# Patient Record
Sex: FEMALE | Race: OTHER | Employment: STUDENT | URBAN - METROPOLITAN AREA
[De-identification: names, ages, dates, MRNs, and addresses within clinical notes are randomized per-mention and may not be internally consistent; named-entity substitution may affect disease eponyms.]

---
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
## 2022-01-10 ENCOUNTER — IMMUNIZATION (OUTPATIENT)
Dept: LAB | Facility: HOSPITAL | Age: 22
End: 2022-01-10
Attending: EMERGENCY MEDICINE
Payer: COMMERCIAL

## 2022-01-10 DIAGNOSIS — Z23 NEED FOR VACCINATION: Primary | ICD-10-CM

## 2022-01-10 PROCEDURE — 0004A SARSCOV2 VAC 30MCG/0.3ML IM: CPT

## 2022-01-10 PROCEDURE — 0054A SARSCOV2 VAC 30MCG/0.3ML IM: CPT

## 2022-02-03 ENCOUNTER — TELEPHONE (OUTPATIENT)
Dept: INTERNAL MEDICINE CLINIC | Facility: CLINIC | Age: 22
End: 2022-02-03

## 2022-07-05 ENCOUNTER — OFFICE VISIT (OUTPATIENT)
Dept: INTERNAL MEDICINE CLINIC | Facility: CLINIC | Age: 22
End: 2022-07-05
Payer: COMMERCIAL

## 2022-07-05 VITALS
HEART RATE: 78 BPM | HEIGHT: 61 IN | SYSTOLIC BLOOD PRESSURE: 112 MMHG | BODY MASS INDEX: 33.83 KG/M2 | OXYGEN SATURATION: 100 % | DIASTOLIC BLOOD PRESSURE: 70 MMHG | WEIGHT: 179.19 LBS

## 2022-07-05 DIAGNOSIS — E55.9 VITAMIN D DEFICIENCY: ICD-10-CM

## 2022-07-05 DIAGNOSIS — Z00.00 ANNUAL PHYSICAL EXAM: Primary | ICD-10-CM

## 2022-07-05 DIAGNOSIS — E66.9 OBESITY, UNSPECIFIED CLASSIFICATION, UNSPECIFIED OBESITY TYPE, UNSPECIFIED WHETHER SERIOUS COMORBIDITY PRESENT: ICD-10-CM

## 2022-07-05 DIAGNOSIS — F41.9 ANXIETY: ICD-10-CM

## 2022-07-05 DIAGNOSIS — R74.8 ELEVATED LIVER ENZYMES: Primary | ICD-10-CM

## 2022-07-05 DIAGNOSIS — E04.9 ENLARGED THYROID: ICD-10-CM

## 2022-07-05 LAB
ALBUMIN SERPL-MCNC: 3.9 G/DL (ref 3.4–5)
ALBUMIN/GLOB SERPL: 1 {RATIO} (ref 1–2)
ALP LIVER SERPL-CCNC: 51 U/L
ALT SERPL-CCNC: 78 U/L
ANION GAP SERPL CALC-SCNC: 10 MMOL/L (ref 0–18)
AST SERPL-CCNC: 53 U/L (ref 15–37)
BASOPHILS # BLD AUTO: 0.07 X10(3) UL (ref 0–0.2)
BASOPHILS NFR BLD AUTO: 1 %
BILIRUB SERPL-MCNC: 0.8 MG/DL (ref 0.1–2)
BUN BLD-MCNC: 12 MG/DL (ref 7–18)
BUN/CREAT SERPL: 17.4 (ref 10–20)
CALCIUM BLD-MCNC: 9.8 MG/DL (ref 8.5–10.1)
CHLORIDE SERPL-SCNC: 105 MMOL/L (ref 98–112)
CHOLEST SERPL-MCNC: 160 MG/DL (ref ?–200)
CO2 SERPL-SCNC: 25 MMOL/L (ref 21–32)
CREAT BLD-MCNC: 0.69 MG/DL
DEPRECATED RDW RBC AUTO: 41.2 FL (ref 35.1–46.3)
EOSINOPHIL # BLD AUTO: 0.24 X10(3) UL (ref 0–0.7)
EOSINOPHIL NFR BLD AUTO: 3.3 %
ERYTHROCYTE [DISTWIDTH] IN BLOOD BY AUTOMATED COUNT: 13.3 % (ref 11–15)
FASTING PATIENT LIPID ANSWER: YES
FASTING STATUS PATIENT QL REPORTED: YES
GLOBULIN PLAS-MCNC: 3.9 G/DL (ref 2.8–4.4)
GLUCOSE BLD-MCNC: 94 MG/DL (ref 70–99)
HCT VFR BLD AUTO: 42.9 %
HDLC SERPL-MCNC: 66 MG/DL (ref 40–59)
HGB BLD-MCNC: 13.7 G/DL
IMM GRANULOCYTES # BLD AUTO: 0.02 X10(3) UL (ref 0–1)
IMM GRANULOCYTES NFR BLD: 0.3 %
LDLC SERPL CALC-MCNC: 83 MG/DL (ref ?–100)
LYMPHOCYTES # BLD AUTO: 3.01 X10(3) UL (ref 1.5–5)
LYMPHOCYTES NFR BLD AUTO: 41.2 %
MCH RBC QN AUTO: 27 PG (ref 26–34)
MCHC RBC AUTO-ENTMCNC: 31.9 G/DL (ref 31–37)
MCV RBC AUTO: 84.4 FL
MONOCYTES # BLD AUTO: 0.55 X10(3) UL (ref 0.1–1)
MONOCYTES NFR BLD AUTO: 7.5 %
NEUTROPHILS # BLD AUTO: 3.42 X10 (3) UL (ref 1.5–7.7)
NEUTROPHILS # BLD AUTO: 3.42 X10(3) UL (ref 1.5–7.7)
NEUTROPHILS NFR BLD AUTO: 46.7 %
NONHDLC SERPL-MCNC: 94 MG/DL (ref ?–130)
OSMOLALITY SERPL CALC.SUM OF ELEC: 290 MOSM/KG (ref 275–295)
PLATELET # BLD AUTO: 328 10(3)UL (ref 150–450)
POTASSIUM SERPL-SCNC: 4.4 MMOL/L (ref 3.5–5.1)
PROT SERPL-MCNC: 7.8 G/DL (ref 6.4–8.2)
RBC # BLD AUTO: 5.08 X10(6)UL
SODIUM SERPL-SCNC: 140 MMOL/L (ref 136–145)
TRIGL SERPL-MCNC: 55 MG/DL (ref 30–149)
TSI SER-ACNC: 1.93 MIU/ML (ref 0.36–3.74)
VIT D+METAB SERPL-MCNC: 9.5 NG/ML (ref 30–100)
VLDLC SERPL CALC-MCNC: 9 MG/DL (ref 0–30)
WBC # BLD AUTO: 7.3 X10(3) UL (ref 4–11)

## 2022-07-05 PROCEDURE — 80053 COMPREHEN METABOLIC PANEL: CPT | Performed by: INTERNAL MEDICINE

## 2022-07-05 PROCEDURE — 99395 PREV VISIT EST AGE 18-39: CPT | Performed by: INTERNAL MEDICINE

## 2022-07-05 PROCEDURE — 80061 LIPID PANEL: CPT | Performed by: INTERNAL MEDICINE

## 2022-07-05 PROCEDURE — 84443 ASSAY THYROID STIM HORMONE: CPT | Performed by: INTERNAL MEDICINE

## 2022-07-05 PROCEDURE — 3078F DIAST BP <80 MM HG: CPT | Performed by: INTERNAL MEDICINE

## 2022-07-05 PROCEDURE — 3074F SYST BP LT 130 MM HG: CPT | Performed by: INTERNAL MEDICINE

## 2022-07-05 PROCEDURE — 82306 VITAMIN D 25 HYDROXY: CPT | Performed by: INTERNAL MEDICINE

## 2022-07-05 PROCEDURE — 85025 COMPLETE CBC W/AUTO DIFF WBC: CPT | Performed by: INTERNAL MEDICINE

## 2022-07-05 PROCEDURE — 3008F BODY MASS INDEX DOCD: CPT | Performed by: INTERNAL MEDICINE

## 2022-07-05 RX ORDER — ERGOCALCIFEROL 1.25 MG/1
50000 CAPSULE ORAL WEEKLY
Qty: 12 CAPSULE | Refills: 1 | Status: SHIPPED | OUTPATIENT
Start: 2022-07-05 | End: 2022-09-21

## 2022-08-08 ENCOUNTER — HOSPITAL ENCOUNTER (OUTPATIENT)
Dept: ULTRASOUND IMAGING | Age: 22
Discharge: HOME | End: 2022-08-08
Attending: INTERNAL MEDICINE
Payer: COMMERCIAL

## 2022-08-08 DIAGNOSIS — E55.9 VITAMIN D DEFICIENCY: ICD-10-CM

## 2022-08-08 DIAGNOSIS — Z00.00 ANNUAL PHYSICAL EXAM: ICD-10-CM

## 2022-08-08 DIAGNOSIS — F41.9 ANXIETY: ICD-10-CM

## 2022-08-08 DIAGNOSIS — E66.9 OBESITY, UNSPECIFIED CLASSIFICATION, UNSPECIFIED OBESITY TYPE, UNSPECIFIED WHETHER SERIOUS COMORBIDITY PRESENT: ICD-10-CM

## 2022-08-08 DIAGNOSIS — E04.9 ENLARGED THYROID: ICD-10-CM

## 2022-08-08 PROCEDURE — 76536 US EXAM OF HEAD AND NECK: CPT | Performed by: INTERNAL MEDICINE

## 2022-11-05 ENCOUNTER — IMMUNIZATION (OUTPATIENT)
Dept: LAB | Age: 22
End: 2022-11-05
Attending: EMERGENCY MEDICINE
Payer: COMMERCIAL

## 2022-11-05 DIAGNOSIS — Z23 NEED FOR VACCINATION: Primary | ICD-10-CM

## 2022-11-05 PROCEDURE — 90471 IMMUNIZATION ADMIN: CPT

## 2022-11-05 PROCEDURE — 90686 IIV4 VACC NO PRSV 0.5 ML IM: CPT

## 2023-01-25 ENCOUNTER — PATIENT MESSAGE (OUTPATIENT)
Dept: INTERNAL MEDICINE CLINIC | Facility: CLINIC | Age: 23
End: 2023-01-25

## 2023-01-25 DIAGNOSIS — E11.9 DIABETIC EYE EXAM (HCC): Primary | ICD-10-CM

## 2023-01-25 DIAGNOSIS — Z01.00 DIABETIC EYE EXAM (HCC): Primary | ICD-10-CM

## 2023-01-25 NOTE — TELEPHONE ENCOUNTER
Aj Moon, [address] 1/25/2023 12:28 PM CST      ----- Message -----  From: Cheyanne Hannah  Sent: 1/25/2023 12:16 PM CST  To: [facility] Clinical Staff  Subject: Referral Tonya Phan,      Can you give me a referral for Dr. Yary Zhang for my annual eye exam, please.     Fatuma Isidro

## 2023-03-20 ENCOUNTER — LAB ENCOUNTER (OUTPATIENT)
Dept: LAB | Facility: REFERENCE LAB | Age: 23
End: 2023-03-20
Attending: INTERNAL MEDICINE
Payer: COMMERCIAL

## 2023-03-20 DIAGNOSIS — R74.8 ELEVATED LIVER ENZYMES: ICD-10-CM

## 2023-03-20 LAB
ALBUMIN SERPL-MCNC: 3.6 G/DL (ref 3.4–5)
ALP LIVER SERPL-CCNC: 71 U/L
ALT SERPL-CCNC: 22 U/L
AST SERPL-CCNC: 18 U/L (ref 15–37)
BILIRUB DIRECT SERPL-MCNC: <0.1 MG/DL (ref 0–0.2)
BILIRUB SERPL-MCNC: 0.4 MG/DL (ref 0.1–2)
PROT SERPL-MCNC: 7.5 G/DL (ref 6.4–8.2)

## 2023-03-20 PROCEDURE — 80076 HEPATIC FUNCTION PANEL: CPT

## 2023-03-20 PROCEDURE — 36415 COLL VENOUS BLD VENIPUNCTURE: CPT

## 2023-05-13 ENCOUNTER — OFFICE VISIT (OUTPATIENT)
Dept: OPHTHALMOLOGY | Facility: CLINIC | Age: 23
End: 2023-05-13

## 2023-05-13 DIAGNOSIS — H52.13 MYOPIA OF BOTH EYES WITH REGULAR ASTIGMATISM: Primary | ICD-10-CM

## 2023-05-13 DIAGNOSIS — H02.886 MEIBOMIAN GLAND DYSFUNCTION (MGD) OF BOTH EYES: ICD-10-CM

## 2023-05-13 DIAGNOSIS — H52.223 MYOPIA OF BOTH EYES WITH REGULAR ASTIGMATISM: Primary | ICD-10-CM

## 2023-05-13 DIAGNOSIS — H02.883 MEIBOMIAN GLAND DYSFUNCTION (MGD) OF BOTH EYES: ICD-10-CM

## 2023-05-13 PROCEDURE — 92014 COMPRE OPH EXAM EST PT 1/>: CPT | Performed by: OPHTHALMOLOGY

## 2023-05-13 PROCEDURE — 92015 DETERMINE REFRACTIVE STATE: CPT | Performed by: OPHTHALMOLOGY

## 2023-05-13 NOTE — ASSESSMENT & PLAN NOTE
New glasses Rx given. Suggest update. Recommend that patient take an \"eye break\" when they are working at the computer. Patient should take a break every 20 minutes, looking away from the computer for 20 seconds at the distance of 20 feet to prevent eye fatigue.

## 2023-05-13 NOTE — PATIENT INSTRUCTIONS
High Myopia of both eyes with regular astigmatism  New glasses Rx given. Suggest update. Recommend that patient take an \"eye break\" when they are working at the computer. Patient should take a break every 20 minutes, looking away from the computer for 20 seconds at the distance of 20 feet to prevent eye fatigue. Meibomian gland dysfunction (MGD) of both eyes  Patient was instructed to use warm compresses to the eyelids twice a day everyday. Instructions for warm compress use:   Patient should place wash compresses on both eyelids for 5 minutes every morning and every night. After 5 minutes of holding the warm compresses on the eyelids, patient should gently rub the eyelashes and then rinse thoroughly with warm water.

## 2023-09-11 ENCOUNTER — OFFICE VISIT (OUTPATIENT)
Dept: INTERNAL MEDICINE CLINIC | Facility: CLINIC | Age: 23
End: 2023-09-11
Payer: COMMERCIAL

## 2023-09-11 VITALS
HEART RATE: 88 BPM | DIASTOLIC BLOOD PRESSURE: 78 MMHG | BODY MASS INDEX: 34.81 KG/M2 | WEIGHT: 184.38 LBS | TEMPERATURE: 98 F | SYSTOLIC BLOOD PRESSURE: 104 MMHG | OXYGEN SATURATION: 98 % | HEIGHT: 61 IN

## 2023-09-11 DIAGNOSIS — Z00.00 ANNUAL PHYSICAL EXAM: Primary | ICD-10-CM

## 2023-09-11 DIAGNOSIS — E55.9 VITAMIN D DEFICIENCY: ICD-10-CM

## 2023-09-11 PROCEDURE — 90471 IMMUNIZATION ADMIN: CPT | Performed by: INTERNAL MEDICINE

## 2023-09-11 PROCEDURE — 3078F DIAST BP <80 MM HG: CPT | Performed by: INTERNAL MEDICINE

## 2023-09-11 PROCEDURE — 3074F SYST BP LT 130 MM HG: CPT | Performed by: INTERNAL MEDICINE

## 2023-09-11 PROCEDURE — 3008F BODY MASS INDEX DOCD: CPT | Performed by: INTERNAL MEDICINE

## 2023-09-11 PROCEDURE — 99395 PREV VISIT EST AGE 18-39: CPT | Performed by: INTERNAL MEDICINE

## 2023-09-11 PROCEDURE — 90715 TDAP VACCINE 7 YRS/> IM: CPT | Performed by: INTERNAL MEDICINE

## 2023-09-23 ENCOUNTER — LAB ENCOUNTER (OUTPATIENT)
Dept: LAB | Age: 23
End: 2023-09-23
Attending: INTERNAL MEDICINE
Payer: COMMERCIAL

## 2023-09-23 DIAGNOSIS — Z00.00 ANNUAL PHYSICAL EXAM: ICD-10-CM

## 2023-09-23 DIAGNOSIS — E55.9 VITAMIN D DEFICIENCY: ICD-10-CM

## 2023-09-23 LAB
ALBUMIN SERPL-MCNC: 3.6 G/DL (ref 3.4–5)
ALBUMIN/GLOB SERPL: 0.9 {RATIO} (ref 1–2)
ALP LIVER SERPL-CCNC: 59 U/L
ALT SERPL-CCNC: 36 U/L
ANION GAP SERPL CALC-SCNC: 6 MMOL/L (ref 0–18)
AST SERPL-CCNC: 23 U/L (ref 15–37)
BASOPHILS # BLD AUTO: 0.06 X10(3) UL (ref 0–0.2)
BASOPHILS NFR BLD AUTO: 0.7 %
BILIRUB SERPL-MCNC: 0.5 MG/DL (ref 0.1–2)
BUN BLD-MCNC: 18 MG/DL (ref 7–18)
BUN/CREAT SERPL: 25.4 (ref 10–20)
CALCIUM BLD-MCNC: 9.6 MG/DL (ref 8.5–10.1)
CHLORIDE SERPL-SCNC: 109 MMOL/L (ref 98–112)
CHOLEST SERPL-MCNC: 174 MG/DL (ref ?–200)
CO2 SERPL-SCNC: 26 MMOL/L (ref 21–32)
CREAT BLD-MCNC: 0.71 MG/DL
DEPRECATED RDW RBC AUTO: 40.5 FL (ref 35.1–46.3)
EGFRCR SERPLBLD CKD-EPI 2021: 124 ML/MIN/1.73M2 (ref 60–?)
EOSINOPHIL # BLD AUTO: 0.26 X10(3) UL (ref 0–0.7)
EOSINOPHIL NFR BLD AUTO: 2.8 %
ERYTHROCYTE [DISTWIDTH] IN BLOOD BY AUTOMATED COUNT: 13.7 % (ref 11–15)
EST. AVERAGE GLUCOSE BLD GHB EST-MCNC: 114 MG/DL (ref 68–126)
FASTING PATIENT LIPID ANSWER: YES
FASTING STATUS PATIENT QL REPORTED: YES
GLOBULIN PLAS-MCNC: 3.8 G/DL (ref 2.8–4.4)
GLUCOSE BLD-MCNC: 101 MG/DL (ref 70–99)
HBA1C MFR BLD: 5.6 % (ref ?–5.7)
HCT VFR BLD AUTO: 40.5 %
HDLC SERPL-MCNC: 58 MG/DL (ref 40–59)
HGB BLD-MCNC: 12.9 G/DL
IMM GRANULOCYTES # BLD AUTO: 0.01 X10(3) UL (ref 0–1)
IMM GRANULOCYTES NFR BLD: 0.1 %
LDLC SERPL CALC-MCNC: 104 MG/DL (ref ?–100)
LYMPHOCYTES # BLD AUTO: 3.53 X10(3) UL (ref 1–4)
LYMPHOCYTES NFR BLD AUTO: 38.6 %
MCH RBC QN AUTO: 25.9 PG (ref 26–34)
MCHC RBC AUTO-ENTMCNC: 31.9 G/DL (ref 31–37)
MCV RBC AUTO: 81.3 FL
MONOCYTES # BLD AUTO: 0.82 X10(3) UL (ref 0.1–1)
MONOCYTES NFR BLD AUTO: 9 %
NEUTROPHILS # BLD AUTO: 4.47 X10 (3) UL (ref 1.5–7.7)
NEUTROPHILS # BLD AUTO: 4.47 X10(3) UL (ref 1.5–7.7)
NEUTROPHILS NFR BLD AUTO: 48.8 %
NONHDLC SERPL-MCNC: 116 MG/DL (ref ?–130)
OSMOLALITY SERPL CALC.SUM OF ELEC: 294 MOSM/KG (ref 275–295)
PLATELET # BLD AUTO: 321 10(3)UL (ref 150–450)
POTASSIUM SERPL-SCNC: 4.2 MMOL/L (ref 3.5–5.1)
PROT SERPL-MCNC: 7.4 G/DL (ref 6.4–8.2)
RBC # BLD AUTO: 4.98 X10(6)UL
SODIUM SERPL-SCNC: 141 MMOL/L (ref 136–145)
TRIGL SERPL-MCNC: 63 MG/DL (ref 30–149)
TSI SER-ACNC: 0.93 MIU/ML (ref 0.36–3.74)
VIT D+METAB SERPL-MCNC: 19.1 NG/ML (ref 30–100)
VLDLC SERPL CALC-MCNC: 11 MG/DL (ref 0–30)
WBC # BLD AUTO: 9.2 X10(3) UL (ref 4–11)

## 2023-09-23 PROCEDURE — 82306 VITAMIN D 25 HYDROXY: CPT

## 2023-09-23 PROCEDURE — 80061 LIPID PANEL: CPT

## 2023-09-23 PROCEDURE — 85025 COMPLETE CBC W/AUTO DIFF WBC: CPT

## 2023-09-23 PROCEDURE — 84443 ASSAY THYROID STIM HORMONE: CPT

## 2023-09-23 PROCEDURE — 36415 COLL VENOUS BLD VENIPUNCTURE: CPT

## 2023-09-23 PROCEDURE — 80053 COMPREHEN METABOLIC PANEL: CPT

## 2023-09-23 PROCEDURE — 83036 HEMOGLOBIN GLYCOSYLATED A1C: CPT

## 2023-09-26 RX ORDER — ERGOCALCIFEROL 1.25 MG/1
50000 CAPSULE ORAL WEEKLY
Qty: 12 CAPSULE | Refills: 1 | Status: SHIPPED | OUTPATIENT
Start: 2023-09-26 | End: 2023-12-13

## 2023-10-09 ENCOUNTER — OFFICE VISIT (OUTPATIENT)
Dept: INTERNAL MEDICINE CLINIC | Facility: CLINIC | Age: 23
End: 2023-10-09
Payer: COMMERCIAL

## 2023-10-09 VITALS
OXYGEN SATURATION: 99 % | HEIGHT: 61 IN | SYSTOLIC BLOOD PRESSURE: 116 MMHG | WEIGHT: 186.63 LBS | DIASTOLIC BLOOD PRESSURE: 82 MMHG | HEART RATE: 71 BPM | BODY MASS INDEX: 35.23 KG/M2

## 2023-10-09 DIAGNOSIS — E55.9 VITAMIN D DEFICIENCY: ICD-10-CM

## 2023-10-09 DIAGNOSIS — R63.5 ABNORMAL WEIGHT GAIN: Primary | ICD-10-CM

## 2023-10-09 DIAGNOSIS — E66.1 CLASS 2 DRUG-INDUCED OBESITY WITHOUT SERIOUS COMORBIDITY WITH BODY MASS INDEX (BMI) OF 35.0 TO 35.9 IN ADULT: ICD-10-CM

## 2024-05-07 ENCOUNTER — OFFICE VISIT (OUTPATIENT)
Dept: INTERNAL MEDICINE CLINIC | Facility: CLINIC | Age: 24
End: 2024-05-07
Payer: COMMERCIAL

## 2024-05-07 VITALS
HEIGHT: 61 IN | DIASTOLIC BLOOD PRESSURE: 72 MMHG | WEIGHT: 190 LBS | OXYGEN SATURATION: 100 % | BODY MASS INDEX: 35.87 KG/M2 | HEART RATE: 70 BPM | SYSTOLIC BLOOD PRESSURE: 120 MMHG

## 2024-05-07 DIAGNOSIS — F41.1 GAD (GENERALIZED ANXIETY DISORDER): ICD-10-CM

## 2024-05-07 DIAGNOSIS — Z51.81 THERAPEUTIC DRUG MONITORING: Primary | ICD-10-CM

## 2024-05-07 DIAGNOSIS — E66.9 OBESITY, UNSPECIFIED CLASSIFICATION, UNSPECIFIED OBESITY TYPE, UNSPECIFIED WHETHER SERIOUS COMORBIDITY PRESENT: ICD-10-CM

## 2024-05-07 PROCEDURE — 3074F SYST BP LT 130 MM HG: CPT | Performed by: INTERNAL MEDICINE

## 2024-05-07 PROCEDURE — 3078F DIAST BP <80 MM HG: CPT | Performed by: INTERNAL MEDICINE

## 2024-05-07 PROCEDURE — 3008F BODY MASS INDEX DOCD: CPT | Performed by: INTERNAL MEDICINE

## 2024-05-07 PROCEDURE — 99214 OFFICE O/P EST MOD 30 MIN: CPT | Performed by: INTERNAL MEDICINE

## 2024-05-07 RX ORDER — METFORMIN HYDROCHLORIDE 750 MG/1
750 TABLET, EXTENDED RELEASE ORAL
Qty: 90 TABLET | Refills: 0 | Status: SHIPPED | OUTPATIENT
Start: 2024-05-07 | End: 2024-08-05

## 2024-05-07 NOTE — PATIENT INSTRUCTIONS
How to Stop Emotional Eating and Overeating   You have to know how to stop emotional eating and stop overeating if you want to lose weight and keep it off successfully.  Emotional eating and overeating can’t really satisfy an insatiable appetite anyway.  And whether or not you’ve been trying to use emotional eating to soothe feelings of stress, depression, loneliness, frustration or boredom, in the long run, overeating to feed those feelings only makes them worse.  But learning how to stop overeating and control emotional eating can support healthy permanent weight loss and make you feel powerful.  Stop Emotional Eating - Don’t Use Foods to Soothe Moods  You probably already know that overeating high-fat, high-calorie, sweet, salty and unhealthy bad carbs won’t fill that empty void inside for long.  But what can you do to learn how to stop emotional overeating?  Most of us learn emotional eating at a very young age. We get into the habit of using food to sooth stressful feelings, alleviate boredom, reward and comfort ourselves, boost our sprits and celebrate with others.  But even though almost everyone’s overeating, you don’t have to. If you’re ready to take that old-frenzied feed-your-feelings bull by the horns, here’s our basic 12 step program for how to stop emotional eating.  1. Make a commitment. Like any established bad habit, nothing will change unless you make a commit to changing your behavior.  2. Practice awareness. To be more conscious of what’s happening, jot down when and what you eat and how you feel before and afterwards.  3. Manage your stress. Healthy emotional distress management is an important life skill. Positive ways to reduce stress include regular exercise, relaxation techniques and getting support from family and friends.  4. Be physically active. Exercise reduces stress and is a great mood enhancer too. So be sure you make time for regular physical activity.  5. Create new comforts. Make  a list of healthy activities you enjoy. And, whenever you feel the need, treat yourself to something on your list.  6. Start eating healthier. When you eat for health you’ll choose more high fiber foods, such as vegetables, beans, whole grains and fresh fruits, plus healthy high protein foods, like fish, lean poultry and low-fat dairy.  7. Eat mini-meals often. By eating 5 or 6 small healthy meals a day, including breakfast, you help keep your blood sugar and moods stable.  8. Get rid of temptations. Don’t keep unhealthy food in the house, don’t shop for food when hungry or stressed and plan ahead before eating out.  9. Get enough sleep. When you’re tired, it’s easier to give in to emotional eating. Consider taking a nap and be sure to get a good nights sleep.  10. Use healthy distraction. Instead of overeating, take a walk, surf the Internet, pet your cat or dog, listen to music, enjoy a warm bath, read a good book, watch a movie, work in the garden or talk to a friend.  11. Practice mindfulness. Mindful eating means paying attention to the act of eating and observing your thoughts and feelings in the process.  12. Get some support. It’s easier to control emotional eating if you have a support network of friends or family. And if no one you know is supportive, make some new health-oriented friends or join a support group.  Learning how to stop emotional eating and overeating is a life-changing experience. Just make sure to stay on track and enjoy the journey.    Posted By Heriberto Ng On December 27, 2015 @ 11:33 am In Healthy Living. Taken from www.[email or web address]

## 2024-05-07 NOTE — PROGRESS NOTES
Arleth Gonzalez is a 23 year old female.    Chief complaint:weight loss follow up , medication refill, therapeutic drug monitoring    HPI:   ARLETH here for follow up on weight loss   Wt Readings from Last 12 Encounters:   05/07/24 190 lb (86.2 kg)   10/09/23 186 lb 9.6 oz (84.6 kg)   09/11/23 184 lb 6.4 oz (83.6 kg)   07/05/22 179 lb 3.2 oz (81.3 kg) (94%, Z= 1.57)*   08/19/20 173 lb (78.5 kg) (94%, Z= 1.55)*   03/05/20 165 lb (74.8 kg) (92%, Z= 1.41)*   09/30/19 168 lb 9.6 oz (76.5 kg) (93%, Z= 1.51)*   05/11/19 161 lb (73 kg) (91%, Z= 1.37)*   03/18/19 156 lb (70.8 kg) (90%, Z= 1.26)*   09/24/18 157 lb 2 oz (71.3 kg) (91%, Z= 1.32)*   09/22/17 157 lb 9.6 oz (71.5 kg) (92%, Z= 1.43)*   04/14/17 151 lb (68.5 kg) (91%, Z= 1.33)*     * Growth percentiles are based on [facility] [place] 2-20 Years[place] data.     Starting weight: 186 lbs   Total weight loss: lost and gained     Medication: No    Typical diet   Breakfast: Lunch: Dinner: Snacks:   Muffins   Had room mate situation    Depends   Sometimes she wont eat anything because she will forget   Bread   Rice and meat   Soup  Same   Rice and meat   Cooked food    Has been drinking a lot of water   Drinking water with sparkling water   Chips here and there        Soda/ juice/ alcohol: sparkling water , juice diluted : 5 times per month     Water intake: inadequate  Exercise: No walking , was taking the stairs     Challenges: meal planning     Side effect of medication: NA         ROBIN   Social anxiety   Anxiety of crowds and closed spaces   Panic attacks at times   Would like to see a therapist       No current outpatient medications on file.      Past Medical History:    Allergic conjunctivitis of both eyes    High Myopia of both eyes with regular astigmatism     No past surgical history on file.     Social History:  Social History     Socioeconomic History    Marital status: Single   Tobacco Use    Smoking status: Never    Smokeless tobacco: Never   Substance and  Sexual Activity    Alcohol use: No     Alcohol/week: 0.0 standard drinks of alcohol    Drug use: No    Sexual activity: Never   Other Topics Concern    Pt has a pacemaker No    Pt has a defibrillator No    Reaction to local anesthetic No   Social History Narrative    School:        Grade:9th    Sports:        Family History   Problem Relation Age of Onset    Migraines Father     Diabetes Father     Lipids Father     Depression Father     Allergies Sister     Colon Cancer Other         family h/o colon cancer    Psoriasis Paternal Grandmother     Anxiety Paternal Grandmother     Cancer Paternal Grandmother         psoriasis    Glaucoma Paternal Grandmother     Other (pica) Paternal Aunt     Schizophrenia Paternal Aunt     Macular degeneration Neg      Patient Active Problem List   Diagnosis    Overweight child    High Myopia of both eyes with regular astigmatism    Allergic conjunctivitis of both eyes    Blepharitis of upper and lower eyelids of both eyes    Obesity    Anxiety    Elevated blood pressure reading    Headache, unspecified headache type    Intestinal infection    Vitamin D deficiency    Enlarged thyroid    Meibomian gland dysfunction (MGD) of both eyes    Abnormal weight gain               REVIEW OF SYSTEMS:   A comprehensive 10 point review of systems was completed.  Pertinent positives and negatives noted in the the HPI          PHYSICAL EXAM:   /72   Pulse 70   Ht 5' 1\" (1.549 m)   Wt 190 lb (86.2 kg)   LMP 10/06/2023   SpO2 100%   BMI 35.90 kg/m²   GENERAL: well developed, well nourished,in no apparent distress  LUNGS: clear to auscultation  CARDIO: RRR without murmur  NEURO: no gross deficits              No orders of the defined types were placed in this encounter.        ASSESSMENT/PLAN:       ICD-10-CM    1. Therapeutic drug monitoring  Z51.81 metFORMIN  MG Oral Tablet 24 Hr     Juan Zhou Navigator      2. Obesity, unspecified classification, unspecified obesity type,  unspecified whether serious comorbidity present  E66.9 metFORMIN  MG Oral Tablet 24 Hr     Juan Zhou Navigator      3. ROBIN (generalized anxiety disorder)  F41.1 metFORMIN  MG Oral Tablet 24 Hr     Juan Zhou Navigator           Plan:  Patient has gained weight  since LOV 6. Patient has lost a total weight loss of 0# since first weight loss consult.  Advised to continue with low carb diet   Goal is less than 50 g per day   Advised to read nutrition labels  Log in carbs if possible   Increase protein intake   exercise goal is 1 hour 3 times per week cardio and strength training   discussed challenges with weight loss   Weigh once a week at least   Avoid sugary drinks or artificially sweetened drinks  Water intake goal is 64 oz per day   Goal weight loss is 9 lbs in 3 months   Advised to start weight loss meds discussed contrave , phentermine or Glp1 injections, metformin   Would like to start metformin discussed side effects   Referral to juan zhou   Discussed option of medications SSRI for anxiety if persistent or worsening symptoms     Plan:  Nutrition: low carb diet   Referral RD/nutritionist :no  Behavior:  Motivational interviewing performed      Discussed strategies to overcome habits/challenges       Reviewed:  Nutrition and the importance of regular protein intake  Labs ordered:    no  Importance of physical activity and reducing sedentary time  Treatment plan         I spent 30 minutes at the day of the service seeing the patient, examination,  completing charting and counseling the patient and/or on coordination of care.  The diagnosis, prognosis, and general treatment was explained to the patient.     Please return to the clinic if you are having persistent or worsening symptoms   Geena Denton MD,   Diplomate of the American Board of Internal Medicine  Diplomate of the American Board of Obesity Medicine

## 2024-05-13 ENCOUNTER — TELEPHONE (OUTPATIENT)
Age: 24
End: 2024-05-13

## 2024-05-24 ENCOUNTER — TELEPHONE (OUTPATIENT)
Age: 24
End: 2024-05-24

## 2024-06-01 ENCOUNTER — APPOINTMENT (OUTPATIENT)
Dept: GENERAL RADIOLOGY | Age: 24
End: 2024-06-01
Attending: NURSE PRACTITIONER
Payer: COMMERCIAL

## 2024-06-01 ENCOUNTER — HOSPITAL ENCOUNTER (OUTPATIENT)
Age: 24
Discharge: HOME | End: 2024-06-01
Payer: COMMERCIAL

## 2024-06-01 VITALS
DIASTOLIC BLOOD PRESSURE: 93 MMHG | RESPIRATION RATE: 16 BRPM | OXYGEN SATURATION: 94 % | HEART RATE: 93 BPM | TEMPERATURE: 98 F | SYSTOLIC BLOOD PRESSURE: 152 MMHG

## 2024-06-01 DIAGNOSIS — S80.00XA CONTUSION OF KNEE, UNSPECIFIED LATERALITY, INITIAL ENCOUNTER: Primary | ICD-10-CM
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
## 2024-06-01 PROCEDURE — 73562 X-RAY EXAM OF KNEE 3: CPT | Performed by: NURSE PRACTITIONER

## 2024-06-01 PROCEDURE — 99204 OFFICE O/P NEW MOD 45 MIN: CPT

## 2024-06-01 PROCEDURE — 99214 OFFICE O/P EST MOD 30 MIN: CPT
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
## 2024-06-01 NOTE — DISCHARGE INSTRUCTIONS
Rest from exacerbating activities for the next week. Apply ice/cold compress for 20min at a time 4-6x daily for the next 2-3 days. Keep the legs elevated when resting as much as possible. You may take Motrin every 6 hours as needed for pain. Take this with food. If additional pain control is needed, you may also take Tylenol every 6 hours. Follow up with your primary provider in the next 2-3 days. Seek additional care in the ER for new or worsening symptoms, fever or increasing pain.

## 2024-06-07 ENCOUNTER — TELEPHONE (OUTPATIENT)
Age: 24
End: 2024-06-07

## 2024-06-07 NOTE — TELEPHONE ENCOUNTER
Tyler Hills,    I am glad that we were able to connect today. Here are some therapy resources that may be a good fit for you. Based on their online profiles, they are in network with your current insurance. Please verify your insurance coverage with any providers that you may choose to call and schedule with directly. If you have any other questions, please give me a call at [phone]. If you need more immediate assistance, or assistance outside of business hours, please contact the [facility] 24/7 helpline at [phone].    [facility]  [address]  [address]  [place] [address]  Phone: [phone]  [email or web address]     [facility]  [address]  [facility] [address]  Phone: [phone]  [email or web address]     [facility]  [address] [place] [address]  [address]  [place] [address]  Phone: [phone]  [email or web address]     [facility]  Locations in [place], [place]  Phone: [phone]  [email or web address]

## 2024-06-10 ENCOUNTER — TELEPHONE (OUTPATIENT)
Age: 24
End: 2024-06-10

## 2024-06-14 ENCOUNTER — TELEPHONE (OUTPATIENT)
Age: 24
End: 2024-06-14

## 2024-06-28 ENCOUNTER — TELEPHONE (OUTPATIENT)
Age: 24
End: 2024-06-28

## 2024-08-15 ENCOUNTER — EKG ENCOUNTER (OUTPATIENT)
Dept: LAB | Age: 24
End: 2024-08-15
Attending: INTERNAL MEDICINE
Payer: COMMERCIAL

## 2024-08-15 ENCOUNTER — TELEPHONE (OUTPATIENT)
Dept: INTERNAL MEDICINE CLINIC | Facility: CLINIC | Age: 24
End: 2024-08-15

## 2024-08-15 DIAGNOSIS — E55.9 VITAMIN D DEFICIENCY: ICD-10-CM

## 2024-08-15 DIAGNOSIS — R63.5 ABNORMAL WEIGHT GAIN: ICD-10-CM

## 2024-08-15 DIAGNOSIS — E66.1 CLASS 2 DRUG-INDUCED OBESITY WITHOUT SERIOUS COMORBIDITY WITH BODY MASS INDEX (BMI) OF 35.0 TO 35.9 IN ADULT: ICD-10-CM

## 2024-08-15 DIAGNOSIS — Z00.00 ANNUAL PHYSICAL EXAM: ICD-10-CM

## 2024-08-15 DIAGNOSIS — E55.9 VITAMIN D DEFICIENCY: Primary | ICD-10-CM

## 2024-08-15 LAB
ATRIAL RATE: 83 BPM
P AXIS: 63 DEGREES
P-R INTERVAL: 116 MS
Q-T INTERVAL: 356 MS
QRS DURATION: 92 MS
QTC CALCULATION (BEZET): 418 MS
R AXIS: 53 DEGREES
T AXIS: 24 DEGREES
VENTRICULAR RATE: 83 BPM

## 2024-08-15 PROCEDURE — 93005 ELECTROCARDIOGRAM TRACING: CPT

## 2024-08-15 PROCEDURE — 93010 ELECTROCARDIOGRAM REPORT: CPT | Performed by: INTERNAL MEDICINE

## 2024-08-15 NOTE — TELEPHONE ENCOUNTER
Patient's mother is asking if Dr. Denton can please enter blood work orders for her daughter.    Her daughter's physical is on 10/1, and she would like to review the results at her physical appointment.

## 2024-08-20 ENCOUNTER — LAB ENCOUNTER (OUTPATIENT)
Dept: LAB | Age: 24
End: 2024-08-20
Attending: INTERNAL MEDICINE
Payer: COMMERCIAL

## 2024-08-20 DIAGNOSIS — R63.5 ABNORMAL WEIGHT GAIN: ICD-10-CM

## 2024-08-20 DIAGNOSIS — F41.1 GAD (GENERALIZED ANXIETY DISORDER): ICD-10-CM

## 2024-08-20 DIAGNOSIS — Z00.00 ANNUAL PHYSICAL EXAM: ICD-10-CM

## 2024-08-20 DIAGNOSIS — E55.9 VITAMIN D DEFICIENCY: ICD-10-CM

## 2024-08-20 LAB
ALBUMIN SERPL-MCNC: 4.6 G/DL (ref 3.2–4.8)
ALBUMIN/GLOB SERPL: 1.6 {RATIO} (ref 1–2)
ALP LIVER SERPL-CCNC: 55 U/L
ALT SERPL-CCNC: 20 U/L
ANION GAP SERPL CALC-SCNC: 7 MMOL/L (ref 0–18)
AST SERPL-CCNC: 23 U/L (ref ?–34)
BASOPHILS # BLD AUTO: 0.07 X10(3) UL (ref 0–0.2)
BASOPHILS NFR BLD AUTO: 0.8 %
BILIRUB SERPL-MCNC: 1.1 MG/DL (ref 0.3–1.2)
BUN BLD-MCNC: 12 MG/DL (ref 9–23)
BUN/CREAT SERPL: 17.1 (ref 10–20)
CALCIUM BLD-MCNC: 10 MG/DL (ref 8.7–10.4)
CHLORIDE SERPL-SCNC: 108 MMOL/L (ref 98–112)
CHOLEST SERPL-MCNC: 185 MG/DL (ref ?–200)
CO2 SERPL-SCNC: 25 MMOL/L (ref 21–32)
CREAT BLD-MCNC: 0.7 MG/DL
DEPRECATED RDW RBC AUTO: 39.3 FL (ref 35.1–46.3)
EGFRCR SERPLBLD CKD-EPI 2021: 125 ML/MIN/1.73M2 (ref 60–?)
EOSINOPHIL # BLD AUTO: 0.16 X10(3) UL (ref 0–0.7)
EOSINOPHIL NFR BLD AUTO: 1.8 %
ERYTHROCYTE [DISTWIDTH] IN BLOOD BY AUTOMATED COUNT: 13.6 % (ref 11–15)
EST. AVERAGE GLUCOSE BLD GHB EST-MCNC: 117 MG/DL (ref 68–126)
FASTING PATIENT LIPID ANSWER: YES
FASTING STATUS PATIENT QL REPORTED: YES
GLOBULIN PLAS-MCNC: 2.9 G/DL (ref 2–3.5)
GLUCOSE BLD-MCNC: 94 MG/DL (ref 70–99)
HBA1C MFR BLD: 5.7 % (ref ?–5.7)
HCT VFR BLD AUTO: 39.9 %
HDLC SERPL-MCNC: 54 MG/DL (ref 40–59)
HGB BLD-MCNC: 13.5 G/DL
IMM GRANULOCYTES # BLD AUTO: 0.01 X10(3) UL (ref 0–1)
IMM GRANULOCYTES NFR BLD: 0.1 %
LDLC SERPL CALC-MCNC: 120 MG/DL (ref ?–100)
LYMPHOCYTES # BLD AUTO: 4.33 X10(3) UL (ref 1–4)
LYMPHOCYTES NFR BLD AUTO: 48.5 %
MCH RBC QN AUTO: 26.8 PG (ref 26–34)
MCHC RBC AUTO-ENTMCNC: 33.8 G/DL (ref 31–37)
MCV RBC AUTO: 79.2 FL
MONOCYTES # BLD AUTO: 0.63 X10(3) UL (ref 0.1–1)
MONOCYTES NFR BLD AUTO: 7.1 %
NEUTROPHILS # BLD AUTO: 3.72 X10 (3) UL (ref 1.5–7.7)
NEUTROPHILS # BLD AUTO: 3.72 X10(3) UL (ref 1.5–7.7)
NEUTROPHILS NFR BLD AUTO: 41.7 %
NONHDLC SERPL-MCNC: 131 MG/DL (ref ?–130)
OSMOLALITY SERPL CALC.SUM OF ELEC: 290 MOSM/KG (ref 275–295)
PLATELET # BLD AUTO: 325 10(3)UL (ref 150–450)
POTASSIUM SERPL-SCNC: 3.8 MMOL/L (ref 3.5–5.1)
PROT SERPL-MCNC: 7.5 G/DL (ref 5.7–8.2)
RBC # BLD AUTO: 5.04 X10(6)UL
SODIUM SERPL-SCNC: 140 MMOL/L (ref 136–145)
TRIGL SERPL-MCNC: 59 MG/DL (ref 30–149)
TSI SER-ACNC: 1.47 MIU/ML (ref 0.55–4.78)
VIT D+METAB SERPL-MCNC: 7.9 NG/ML (ref 30–100)
VLDLC SERPL CALC-MCNC: 10 MG/DL (ref 0–30)
WBC # BLD AUTO: 8.9 X10(3) UL (ref 4–11)

## 2024-08-20 PROCEDURE — 84443 ASSAY THYROID STIM HORMONE: CPT

## 2024-08-20 PROCEDURE — 80061 LIPID PANEL: CPT

## 2024-08-20 PROCEDURE — 83540 ASSAY OF IRON: CPT

## 2024-08-20 PROCEDURE — 84466 ASSAY OF TRANSFERRIN: CPT

## 2024-08-20 PROCEDURE — 85025 COMPLETE CBC W/AUTO DIFF WBC: CPT

## 2024-08-20 PROCEDURE — 36415 COLL VENOUS BLD VENIPUNCTURE: CPT

## 2024-08-20 PROCEDURE — 82306 VITAMIN D 25 HYDROXY: CPT

## 2024-08-20 PROCEDURE — 82728 ASSAY OF FERRITIN: CPT

## 2024-08-20 PROCEDURE — 80053 COMPREHEN METABOLIC PANEL: CPT

## 2024-08-20 PROCEDURE — 83036 HEMOGLOBIN GLYCOSYLATED A1C: CPT

## 2024-08-21 ENCOUNTER — OFFICE VISIT (OUTPATIENT)
Dept: INTERNAL MEDICINE CLINIC | Facility: CLINIC | Age: 24
End: 2024-08-21
Payer: COMMERCIAL

## 2024-08-21 VITALS
HEART RATE: 76 BPM | WEIGHT: 189.38 LBS | BODY MASS INDEX: 35.75 KG/M2 | HEIGHT: 61 IN | OXYGEN SATURATION: 98 % | DIASTOLIC BLOOD PRESSURE: 84 MMHG | SYSTOLIC BLOOD PRESSURE: 126 MMHG

## 2024-08-21 DIAGNOSIS — R73.03 PREDIABETES: Primary | ICD-10-CM

## 2024-08-21 DIAGNOSIS — F41.1 GAD (GENERALIZED ANXIETY DISORDER): ICD-10-CM

## 2024-08-21 DIAGNOSIS — R63.5 ABNORMAL WEIGHT GAIN: ICD-10-CM

## 2024-08-21 DIAGNOSIS — E55.9 VITAMIN D DEFICIENCY: ICD-10-CM

## 2024-08-21 DIAGNOSIS — E61.1 IRON DEFICIENCY: Primary | ICD-10-CM

## 2024-08-21 DIAGNOSIS — E66.1 CLASS 2 DRUG-INDUCED OBESITY WITH SERIOUS COMORBIDITY AND BODY MASS INDEX (BMI) OF 35.0 TO 35.9 IN ADULT: ICD-10-CM

## 2024-08-21 LAB
DEPRECATED HBV CORE AB SER IA-ACNC: 9.9 NG/ML
IRON SATN MFR SERPL: 19 %
IRON SERPL-MCNC: 70 UG/DL
TIBC SERPL-MCNC: 368 UG/DL (ref 250–425)
TRANSFERRIN SERPL-MCNC: 247 MG/DL (ref 250–380)

## 2024-08-21 PROCEDURE — 3008F BODY MASS INDEX DOCD: CPT | Performed by: INTERNAL MEDICINE

## 2024-08-21 PROCEDURE — 99214 OFFICE O/P EST MOD 30 MIN: CPT | Performed by: INTERNAL MEDICINE

## 2024-08-21 PROCEDURE — 3079F DIAST BP 80-89 MM HG: CPT | Performed by: INTERNAL MEDICINE

## 2024-08-21 PROCEDURE — 3074F SYST BP LT 130 MM HG: CPT | Performed by: INTERNAL MEDICINE

## 2024-08-21 RX ORDER — TIRZEPATIDE 5 MG/.5ML
5 INJECTION, SOLUTION SUBCUTANEOUS WEEKLY
Qty: 2 ML | Refills: 0 | Status: SHIPPED | OUTPATIENT
Start: 2024-08-21 | End: 2024-09-12

## 2024-08-21 RX ORDER — ERGOCALCIFEROL 1.25 MG/1
50000 CAPSULE, LIQUID FILLED ORAL WEEKLY
Qty: 12 CAPSULE | Refills: 1 | Status: SHIPPED | OUTPATIENT
Start: 2024-08-21 | End: 2024-11-07

## 2024-08-21 RX ORDER — METHYLDOPA 500 MG
160 TABLET ORAL DAILY
Qty: 90 TABLET | Refills: 1 | Status: SHIPPED | OUTPATIENT
Start: 2024-08-21 | End: 2024-11-19

## 2024-08-21 RX ORDER — TIRZEPATIDE 2.5 MG/.5ML
2.5 INJECTION, SOLUTION SUBCUTANEOUS WEEKLY
Qty: 2 ML | Refills: 0 | Status: SHIPPED | OUTPATIENT
Start: 2024-08-21 | End: 2024-09-12

## 2024-08-21 NOTE — PROGRESS NOTES
Arleth Gonzalez is a 24 year old female.    Chief complaint:weight loss follow up , medication refill, therapeutic drug monitoring    HPI:   ARLETH here for follow up on weight loss   Wt Readings from Last 12 Encounters:   08/21/24 189 lb 6.4 oz (85.9 kg)   05/07/24 190 lb (86.2 kg)   10/09/23 186 lb 9.6 oz (84.6 kg)   09/11/23 184 lb 6.4 oz (83.6 kg)   07/05/22 179 lb 3.2 oz (81.3 kg) (94%, Z= 1.57)*   08/19/20 173 lb (78.5 kg) (94%, Z= 1.55)*   03/05/20 165 lb (74.8 kg) (92%, Z= 1.41)*   09/30/19 168 lb 9.6 oz (76.5 kg) (93%, Z= 1.51)*   05/11/19 161 lb (73 kg) (91%, Z= 1.37)*   03/18/19 156 lb (70.8 kg) (90%, Z= 1.26)*   09/24/18 157 lb 2 oz (71.3 kg) (91%, Z= 1.32)*   09/22/17 157 lb 9.6 oz (71.5 kg) (92%, Z= 1.43)*     * Growth percentiles are based on [facility] (Girls, 2-20 Years) data.     Starting weight: 186 lbs     Total weight loss: 0  Medication: metformin twice per week   Was not taking it daily       Typical diet   Breakfast: Lunch: Dinner: Snacks:   Skips breakfast majority of the times   Coffee in the morning   Bread with jam    Some form of meat and some carbs   Changes    Recently hasnot been eating dinner   Sometimes eats rice and meat      Chocolate here and there        Sometimes only eats 2 meals per day   Yesterday ate 1 meal per day   Not counting     Soda/ juice/ alcohol: no alcohol, fruit smoothie, small cup of juice in the smoothie   Cruz jaffe juice but infrequent     Water intake: not enough     Exercise: tries to do 3 times per week most of the time 2ce per week     Challenges: meal planning     Side effect of medication: no   Score to self ( how well she is doing ):6/10      No current outpatient medications on file.      Past Medical History:    Allergic conjunctivitis of both eyes    High Myopia of both eyes with regular astigmatism     No past surgical history on file.     Social History:  Social History     Socioeconomic History    Marital status: Single   Tobacco Use     Smoking status: Never    Smokeless tobacco: Never   Substance and Sexual Activity    Alcohol use: No     Alcohol/week: 0.0 standard drinks of alcohol    Drug use: No    Sexual activity: Never   Other Topics Concern    Pt has a pacemaker No    Pt has a defibrillator No    Reaction to local anesthetic No   Social History Narrative    School:        Grade:9th    Sports:        Family History   Problem Relation Age of Onset    Migraines Father     Diabetes Father     Lipids Father     Depression Father     Allergies Sister     Colon Cancer Other         family h/o colon cancer    Psoriasis Paternal Grandmother     Anxiety Paternal Grandmother     Cancer Paternal Grandmother         psoriasis    Glaucoma Paternal Grandmother     Other (pica) Paternal Aunt     Schizophrenia Paternal Aunt     Macular degeneration Neg      Patient Active Problem List   Diagnosis    Overweight child    High Myopia of both eyes with regular astigmatism    Allergic conjunctivitis of both eyes    Blepharitis of upper and lower eyelids of both eyes    Obesity    Anxiety    Elevated blood pressure reading    Headache, unspecified headache type    Intestinal infection    Vitamin D deficiency    Enlarged thyroid    Meibomian gland dysfunction (MGD) of both eyes    Abnormal weight gain    ROBIN (generalized anxiety disorder)               REVIEW OF SYSTEMS:   A comprehensive 10 point review of systems was completed.  Pertinent positives and negatives noted in the the HPI          PHYSICAL EXAM:   /84   Pulse 76   Ht 5' 1\" (1.549 m)   Wt 189 lb 6.4 oz (85.9 kg)   LMP 05/11/2024 (Approximate)   SpO2 98%   BMI 35.79 kg/m²   GENERAL: well developed, well nourished,in no apparent distress  LUNGS: clear to auscultation  CARDIO: RRR without murmur  NEURO: no gross deficits              No orders of the defined types were placed in this encounter.        ASSESSMENT/PLAN:       ICD-10-CM    1. Prediabetes  R73.03 ergocalciferol 1.25 MG (29110 UT)  Oral Cap     Tirzepatide-Weight Management (ZEPBOUND) 2.5 MG/0.5ML Subcutaneous Solution Auto-injector     Tirzepatide-Weight Management (ZEPBOUND) 5 MG/0.5ML Subcutaneous Solution Auto-injector     CBC With Differential With Platelet     Iron And Tibc     Ferritin      2. ROBIN (generalized anxiety disorder)  F41.1 ergocalciferol 1.25 MG (95674 UT) Oral Cap     Tirzepatide-Weight Management (ZEPBOUND) 2.5 MG/0.5ML Subcutaneous Solution Auto-injector     Tirzepatide-Weight Management (ZEPBOUND) 5 MG/0.5ML Subcutaneous Solution Auto-injector     CBC With Differential With Platelet     Iron And Tibc     Ferritin      3. Abnormal weight gain  R63.5 ergocalciferol 1.25 MG (61094 UT) Oral Cap     Tirzepatide-Weight Management (ZEPBOUND) 2.5 MG/0.5ML Subcutaneous Solution Auto-injector     Tirzepatide-Weight Management (ZEPBOUND) 5 MG/0.5ML Subcutaneous Solution Auto-injector     CBC With Differential With Platelet     Iron And Tibc     Ferritin      4. Class 2 drug-induced obesity with serious comorbidity and body mass index (BMI) of 35.0 to 35.9 in adult  E66.1 ergocalciferol 1.25 MG (44543 UT) Oral Cap    Z68.35 Tirzepatide-Weight Management (ZEPBOUND) 2.5 MG/0.5ML Subcutaneous Solution Auto-injector     Tirzepatide-Weight Management (ZEPBOUND) 5 MG/0.5ML Subcutaneous Solution Auto-injector     CBC With Differential With Platelet     Iron And Tibc     Ferritin      5. Vitamin D deficiency  E55.9 ergocalciferol 1.25 MG (09745 UT) Oral Cap     Tirzepatide-Weight Management (ZEPBOUND) 2.5 MG/0.5ML Subcutaneous Solution Auto-injector     Tirzepatide-Weight Management (ZEPBOUND) 5 MG/0.5ML Subcutaneous Solution Auto-injector     CBC With Differential With Platelet     Iron And Tibc     Ferritin         Plan:  Patient has lost 1 lbs # since LOV . Patient has lost a total weight loss of 0 # since first weight loss consult.  Labs reviewed  Advised to continue with low carb diet   Goal is less than 50 g per day   Advised to read  nutrition labels  Log in carbs if possible   Increase protein intake   exercise goal is 1 hour 3 times per week cardio and strength training   discussed challenges with weight loss   Weigh once a week at least   Avoid sugary drinks or artificially sweetened drinks  Water intake goal is 64 oz per day   Goal weight loss is 9 lbs in 3 months   DC metformin   Starting zepbound discussed black box warning and side effects. No family history of thyroid cancer. No history of pancreatitis    Discussed blood work prediabetes will repeat level in 6 months   Abnormal cbc repeat level in 4 weeks   Ergocalciferol weekly x 6 months       Plan:  Nutrition: low carb diet   Referral RD/nutritionist : no  Behavior:  Motivational interviewing performed      Discussed strategies to overcome habits/challenges       Reviewed:  Nutrition and the importance of regular protein intake  Labs ordered:   no  Importance of physical activity and reducing sedentary time  Treatment plan   I spent 30 minutes at the day of the service seeing the patient, examination, reviewing labs, independently interpreting results, completing charting and counseling the patient and/or on coordination of care.  The diagnosis, prognosis, and general treatment was explained to the patient.     Please return to the clinic if you are having persistent or worsening symptoms   Geena Denton MD,   Diplomate of the American Board of Internal Medicine  Diplomate of the American Board of Obesity Medicine

## 2024-08-21 NOTE — PATIENT INSTRUCTIONS
How to Stop Emotional Eating and Overeating   You have to know how to stop emotional eating and stop overeating if you want to lose weight and keep it off successfully.  Emotional eating and overeating can’t really satisfy an insatiable appetite anyway.  And whether or not you’ve been trying to use emotional eating to soothe feelings of stress, depression, loneliness, frustration or boredom, in the long run, overeating to feed those feelings only makes them worse.  But learning how to stop overeating and control emotional eating can support healthy permanent weight loss and make you feel powerful.  Stop Emotional Eating - Don’t Use Foods to Soothe Moods  You probably already know that overeating high-fat, high-calorie, sweet, salty and unhealthy bad carbs won’t fill that empty void inside for long.  But what can you do to learn how to stop emotional overeating?  Most of us learn emotional eating at a very young age. We get into the habit of using food to sooth stressful feelings, alleviate boredom, reward and comfort ourselves, boost our sprits and celebrate with others.  But even though almost everyone’s overeating, you don’t have to. If you’re ready to take that old-frenzied feed-your-feelings bull by the horns, here’s our basic 12 step program for how to stop emotional eating.  1. Make a commitment. Like any established bad habit, nothing will change unless you make a commit to changing your behavior.  2. Practice awareness. To be more conscious of what’s happening, jot down when and what you eat and how you feel before and afterwards.  3. Manage your stress. Healthy emotional distress management is an important life skill. Positive ways to reduce stress include regular exercise, relaxation techniques and getting support from family and friends.  4. Be physically active. Exercise reduces stress and is a great mood enhancer too. So be sure you make time for regular physical activity.  5. Create new comforts. Make  a list of healthy activities you enjoy. And, whenever you feel the need, treat yourself to something on your list.  6. Start eating healthier. When you eat for health you’ll choose more high fiber foods, such as vegetables, beans, whole grains and fresh fruits, plus healthy high protein foods, like fish, lean poultry and low-fat dairy.  7. Eat mini-meals often. By eating 5 or 6 small healthy meals a day, including breakfast, you help keep your blood sugar and moods stable.  8. Get rid of temptations. Don’t keep unhealthy food in the house, don’t shop for food when hungry or stressed and plan ahead before eating out.  9. Get enough sleep. When you’re tired, it’s easier to give in to emotional eating. Consider taking a nap and be sure to get a good nights sleep.  10. Use healthy distraction. Instead of overeating, take a walk, surf the Internet, pet your cat or dog, listen to music, enjoy a warm bath, read a good book, watch a movie, work in the garden or talk to a friend.  11. Practice mindfulness. Mindful eating means paying attention to the act of eating and observing your thoughts and feelings in the process.  12. Get some support. It’s easier to control emotional eating if you have a support network of friends or family. And if no one you know is supportive, make some new health-oriented friends or join a support group.  Learning how to stop emotional eating and overeating is a life-changing experience. Just make sure to stay on track and enjoy the journey.    Posted By Heriberto Ng On December 27, 2015 @ 11:33 am In Healthy Living. Taken from [email or web address]

## 2024-08-22 ENCOUNTER — TELEPHONE (OUTPATIENT)
Dept: INTERNAL MEDICINE CLINIC | Facility: CLINIC | Age: 24
End: 2024-08-22

## 2024-08-22 NOTE — TELEPHONE ENCOUNTER
Prior authorization was completed today for     Medication: ZEPBOUND 2.5 MG     Source: [employer]    Indication: E66.1      Outcome    Approved today by [facility] 2017    Your request was approved based on the initial information provided at the time of the coverage request submission. Please allow additional time for the final decision to be made and added to the patient's account.

## 2024-08-23 ENCOUNTER — MED REC SCAN ONLY (OUTPATIENT)
Dept: INTERNAL MEDICINE CLINIC | Facility: CLINIC | Age: 24
End: 2024-08-23

## 2024-09-22 DIAGNOSIS — E55.9 VITAMIN D DEFICIENCY: ICD-10-CM

## 2024-09-22 DIAGNOSIS — R63.5 ABNORMAL WEIGHT GAIN: ICD-10-CM

## 2024-09-22 DIAGNOSIS — R73.03 PREDIABETES: ICD-10-CM

## 2024-09-22 DIAGNOSIS — E66.812 CLASS 2 DRUG-INDUCED OBESITY WITH SERIOUS COMORBIDITY AND BODY MASS INDEX (BMI) OF 35.0 TO 35.9 IN ADULT: ICD-10-CM

## 2024-09-22 DIAGNOSIS — E66.1 CLASS 2 DRUG-INDUCED OBESITY WITH SERIOUS COMORBIDITY AND BODY MASS INDEX (BMI) OF 35.0 TO 35.9 IN ADULT: ICD-10-CM

## 2024-09-22 DIAGNOSIS — F41.1 GAD (GENERALIZED ANXIETY DISORDER): ICD-10-CM

## 2024-09-23 RX ORDER — TIRZEPATIDE 2.5 MG/.5ML
INJECTION, SOLUTION SUBCUTANEOUS
Qty: 2 ML | Refills: 0 | OUTPATIENT
Start: 2024-09-23

## 2024-09-23 RX ORDER — TIRZEPATIDE 5 MG/.5ML
5 INJECTION, SOLUTION SUBCUTANEOUS WEEKLY
Qty: 6 ML | Refills: 0 | Status: SHIPPED | OUTPATIENT
Start: 2024-09-23

## 2024-09-23 NOTE — TELEPHONE ENCOUNTER
MEDICATION REFILL REQUEST:    Future Appointment: 10/01/2024    Last appointment: 08/21/2024    Medication requested:   Requested Prescriptions     Pending Prescriptions Disp Refills    ZEPBOUND 2.5 MG/0.5ML Subcutaneous Solution Auto-injector [Pharmacy Med Name: ZEPBOUND 2.5MG/0.5ML INJ (4PF PENS)] 2 mL 0     Sig: ADMINISTER 2.5 MG UNDER THE SKIN 1 TIME A WEEK FOR 4 DOSES     Last refill date:    Disp Refills Start End    Tirzepatide-Weight Management (ZEPBOUND) 2.5 MG/0.5ML Subcutaneous Solution Auto-injector 2 mL 0 8/21/2024 9/12/2024    Sig - Route: Inject 2.5 mg into the skin once a week for 4 doses. - Subcutaneous    Sent to pharmacy as: Zepbound 2.5 MG/0.5ML Subcutaneous Solution Auto-injector (Tirzepatide-Weight Management)    E-Prescribing Status: Receipt confirmed by pharmacy (8/21/2024 12:35 PM CDT)

## 2024-10-01 ENCOUNTER — OFFICE VISIT (OUTPATIENT)
Dept: INTERNAL MEDICINE CLINIC | Facility: CLINIC | Age: 24
End: 2024-10-01
Payer: COMMERCIAL

## 2024-10-01 VITALS
OXYGEN SATURATION: 97 % | BODY MASS INDEX: 34.86 KG/M2 | DIASTOLIC BLOOD PRESSURE: 80 MMHG | HEIGHT: 61 IN | HEART RATE: 97 BPM | WEIGHT: 184.63 LBS | SYSTOLIC BLOOD PRESSURE: 122 MMHG

## 2024-10-01 DIAGNOSIS — F41.1 GAD (GENERALIZED ANXIETY DISORDER): ICD-10-CM

## 2024-10-01 DIAGNOSIS — R73.03 PREDIABETES: ICD-10-CM

## 2024-10-01 DIAGNOSIS — Z00.00 ANNUAL PHYSICAL EXAM: Primary | ICD-10-CM

## 2024-10-01 DIAGNOSIS — E55.9 VITAMIN D DEFICIENCY: ICD-10-CM

## 2024-10-01 PROCEDURE — 3008F BODY MASS INDEX DOCD: CPT | Performed by: INTERNAL MEDICINE

## 2024-10-01 PROCEDURE — 99395 PREV VISIT EST AGE 18-39: CPT | Performed by: INTERNAL MEDICINE

## 2024-10-01 PROCEDURE — 3074F SYST BP LT 130 MM HG: CPT | Performed by: INTERNAL MEDICINE

## 2024-10-01 PROCEDURE — 3079F DIAST BP 80-89 MM HG: CPT | Performed by: INTERNAL MEDICINE

## 2024-10-01 NOTE — PROGRESS NOTES
Jeana Gonzalez is a 24 year old female.    Chief complaint: annual physical exam       HPI:     Jeana Gonzalez is a 24 year old pleasant female who presents for annual physical exam     Obesity   Started zepbound 5 mg on Sunday   This only the 2nd month         Anxiety is better       No chest pain no sob no abdominal pain  No diarrhea or constipation   No fever or chills   No urinary complaints        Period is long 7 days  Heavy the first 2 days   Then mild    No smoking   Never been a smoker   No alcohol   Exercise : did go consistent   Now less   Family history of cancer: uncle: brain cancer         Current Outpatient Medications   Medication Sig Dispense Refill    Tirzepatide-Weight Management (ZEPBOUND) 5 MG/0.5ML Subcutaneous Solution Auto-injector Inject 5 mg into the skin once a week. 6 mL 0    ergocalciferol 1.25 MG (26511 UT) Oral Cap Take 1 capsule (50,000 Units total) by mouth once a week for 12 doses. 12 capsule 1    Ferrous Sulfate Dried ER (SLOW RELEASE IRON) 160 (50 Fe) MG Oral Tab CR Take 160 mg by mouth daily. 90 tablet 1      Past Medical History:    Allergic conjunctivitis of both eyes    High Myopia of both eyes with regular astigmatism     No past surgical history on file.          Family History   Problem Relation Age of Onset    Migraines Father     Diabetes Father     Lipids Father     Depression Father     Allergies Sister     Colon Cancer Other         family h/o colon cancer    Psoriasis Paternal Grandmother     Anxiety Paternal Grandmother     Cancer Paternal Grandmother         psoriasis    Glaucoma Paternal Grandmother     Other (pica) Paternal Aunt     Schizophrenia Paternal Aunt     Macular degeneration Neg      Patient Active Problem List   Diagnosis    Overweight child    High Myopia of both eyes with regular astigmatism    Allergic conjunctivitis of both eyes    Blepharitis of upper and lower eyelids of both eyes    Obesity    Anxiety    Elevated blood  pressure reading    Headache, unspecified headache type    Intestinal infection    Vitamin D deficiency    Enlarged thyroid    Meibomian gland dysfunction (MGD) of both eyes    Abnormal weight gain    ROBIN (generalized anxiety disorder)    Prediabetes       REVIEW OF SYSTEMS:   A comprehensive 10 point review of systems was completed.  Pertinent positives and negatives noted in the the HPI            EXAM:   /80   Pulse 97   Ht 5' 1\" (1.549 m)   Wt 184 lb 9.6 oz (83.7 kg)   LMP 09/21/2024 (Approximate)   SpO2 97%   BMI 34.88 kg/m²   GENERAL: well developed, well nourished,in no apparent distress  SKIN: no rashes,no suspicious lesions  HEENT: atraumatic, normocephalic,ears and throat are clear  NECK: supple,no adenopathy  LUNGS: clear to auscultation  CARDIO: RRR without murmur  GI: no masses, HSM or tenderness  EXTREMITIES: no cyanosis, clubbing or edema  NEURO: no gross deficits              No orders of the defined types were placed in this encounter.    No results found.         ASSESSMENT AND PLAN:       ICD-10-CM    1. Annual physical exam  Z00.00       2. ROBIN (generalized anxiety disorder)  F41.1       3. Vitamin D deficiency  E55.9       4. Prediabetes  R73.03        Diet and exercise   Self breast exam   Sun screen recommended   Fasting blood work   Discussed guidelines indication  for Pap smear, patient never had sex  Will rediscuss on the next annual physical exam  Goal weight loss is 3 pounds a month  If she does not lose weight by next month to increase the dose of the zepbound  to 7.5  Advised to repeat CBC      Please return to the clinic if you are having persistent symptoms. If worsening symptoms should go to the ER    Geena Denton MD,   Diplomate of the American Board of Internal Medicine  Diplomate of the American Board of Obesity Medicine

## 2024-12-07 ENCOUNTER — OFFICE VISIT (OUTPATIENT)
Dept: INTERNAL MEDICINE CLINIC | Facility: CLINIC | Age: 24
End: 2024-12-07
Payer: COMMERCIAL

## 2024-12-07 VITALS
WEIGHT: 167.81 LBS | OXYGEN SATURATION: 97 % | HEART RATE: 88 BPM | DIASTOLIC BLOOD PRESSURE: 76 MMHG | BODY MASS INDEX: 31.68 KG/M2 | SYSTOLIC BLOOD PRESSURE: 110 MMHG | HEIGHT: 61 IN

## 2024-12-07 DIAGNOSIS — E61.1 IRON DEFICIENCY: ICD-10-CM

## 2024-12-07 DIAGNOSIS — R73.03 PREDIABETES: ICD-10-CM

## 2024-12-07 DIAGNOSIS — E66.811 CLASS 1 OBESITY WITH BODY MASS INDEX (BMI) OF 31.0 TO 31.9 IN ADULT, UNSPECIFIED OBESITY TYPE, UNSPECIFIED WHETHER SERIOUS COMORBIDITY PRESENT: ICD-10-CM

## 2024-12-07 DIAGNOSIS — K62.5 BRBPR (BRIGHT RED BLOOD PER RECTUM): Primary | ICD-10-CM

## 2024-12-07 PROCEDURE — 99214 OFFICE O/P EST MOD 30 MIN: CPT | Performed by: INTERNAL MEDICINE

## 2024-12-07 PROCEDURE — 3008F BODY MASS INDEX DOCD: CPT | Performed by: INTERNAL MEDICINE

## 2024-12-07 PROCEDURE — 3074F SYST BP LT 130 MM HG: CPT | Performed by: INTERNAL MEDICINE

## 2024-12-07 PROCEDURE — 3078F DIAST BP <80 MM HG: CPT | Performed by: INTERNAL MEDICINE

## 2024-12-07 RX ORDER — FERROUS SULFATE 324(65)MG
1 TABLET, DELAYED RELEASE (ENTERIC COATED) ORAL 2 TIMES DAILY
Qty: 180 TABLET | Refills: 1 | Status: SHIPPED | OUTPATIENT
Start: 2024-12-07 | End: 2025-03-07

## 2024-12-07 RX ORDER — ONDANSETRON 4 MG/1
4 TABLET, ORALLY DISINTEGRATING ORAL EVERY 8 HOURS PRN
Qty: 30 TABLET | Refills: 0 | Status: SHIPPED | OUTPATIENT
Start: 2024-12-07

## 2024-12-07 RX ORDER — NICOTINE POLACRILEX 4 MG/1
1 GUM, CHEWING ORAL
Qty: 30 TABLET | Refills: 2 | Status: SHIPPED | OUTPATIENT
Start: 2024-12-07 | End: 2025-01-06

## 2024-12-07 NOTE — PROGRESS NOTES
Arleth Gonzalez is a 24 year old female.    Chief complaint:weight loss follow up , medication refill, therapeutic drug monitoring    HPI:   ARLETH here for follow up on weight loss   Wt Readings from Last 12 Encounters:   12/07/24 167 lb 12.8 oz (76.1 kg)   10/01/24 184 lb 9.6 oz (83.7 kg)   08/21/24 189 lb 6.4 oz (85.9 kg)   05/07/24 190 lb (86.2 kg)   10/09/23 186 lb 9.6 oz (84.6 kg)   09/11/23 184 lb 6.4 oz (83.6 kg)   07/05/22 179 lb 3.2 oz (81.3 kg) (94%, Z= 1.57)*   08/19/20 173 lb (78.5 kg) (94%, Z= 1.55)*   03/05/20 165 lb (74.8 kg) (92%, Z= 1.41)*   09/30/19 168 lb 9.6 oz (76.5 kg) (93%, Z= 1.51)*   05/11/19 161 lb (73 kg) (91%, Z= 1.37)*   03/18/19 156 lb (70.8 kg) (90%, Z= 1.26)*     * Growth percentiles are based on CDC (Girls, 2-20 Years) data.     Starting weight: 186 lbs     Total weight loss: 19 lbs   Medication   Zepbound       Typical diet   Breakfast: Lunch: Dinner: Snacks:   Used to do singular activia yogurt   Digestive cookies    Whatever mom packs   Meat and veggies   Meat and rice    The same Was not snacking on it before   Now random   Sometimes pistachio   Pop corn      Since started using humidifier doesn't have vomiting issues   Not counting crabs or protein     Soda/ juice/ alcohol: was off the soda but when she started feeling nauseated sprite once per day     Water intake: inadequate  Exercise: no     Challenges: exercise   Food choices     Side effect of medication: vomiting 3 times per week     Score to self ( how well she is doing ):6.5 mg       GERD       Iron def   Not taking iron consistently  Chance to schedule an appointment with a gastroenterologist        Most recently in the last 1 to 2 weeks has been having some blood with the stool  No abdominal pain  Normal bowel movement      Current Outpatient Medications   Medication Sig Dispense Refill    Tirzepatide-Weight Management (ZEPBOUND) 5 MG/0.5ML Subcutaneous Solution Auto-injector Inject 5 mg into the skin once a  week. 6 mL 0      Past Medical History:    Allergic conjunctivitis of both eyes    High Myopia of both eyes with regular astigmatism     No past surgical history on file.     Social History:  Social History     Socioeconomic History    Marital status: Single   Tobacco Use    Smoking status: Never    Smokeless tobacco: Never   Substance and Sexual Activity    Alcohol use: No     Alcohol/week: 0.0 standard drinks of alcohol    Drug use: No    Sexual activity: Never   Other Topics Concern    Pt has a pacemaker No    Pt has a defibrillator No    Reaction to local anesthetic No   Social History Narrative    School:        Grade:9th    Sports:        Family History   Problem Relation Age of Onset    Migraines Father     Diabetes Father     Lipids Father     Depression Father     Allergies Sister     Colon Cancer Other         family h/o colon cancer    Psoriasis Paternal Grandmother     Anxiety Paternal Grandmother     Cancer Paternal Grandmother         psoriasis    Glaucoma Paternal Grandmother     Other (pica) Paternal Aunt     Schizophrenia Paternal Aunt     Macular degeneration Neg      Patient Active Problem List   Diagnosis    Overweight child    High Myopia of both eyes with regular astigmatism    Allergic conjunctivitis of both eyes    Blepharitis of upper and lower eyelids of both eyes    Obesity    Anxiety    Elevated blood pressure reading    Headache, unspecified headache type    Intestinal infection    Vitamin D deficiency    Enlarged thyroid    Meibomian gland dysfunction (MGD) of both eyes    Abnormal weight gain    ROBIN (generalized anxiety disorder)    Prediabetes    Annual physical exam               REVIEW OF SYSTEMS:   A comprehensive 10 point review of systems was completed.  Pertinent positives and negatives noted in the the HPI          PHYSICAL EXAM:   /76   Pulse 88   Ht 5' 1\" (1.549 m)   Wt 167 lb 12.8 oz (76.1 kg)   LMP 11/21/2024 (Exact Date)   SpO2 97%   BMI 31.71 kg/m²   GENERAL:  well developed, well nourished,in no apparent distress  LUNGS: clear to auscultation  CARDIO: RRR without murmur  NEURO: no gross deficits              No orders of the defined types were placed in this encounter.        ASSESSMENT/PLAN:       ICD-10-CM    1. BRBPR (bright red blood per rectum)  K62.5 CBC With Differential With Platelet     Iron And Tibc     Ferritin     Ferrous Sulfate 324 (65 Fe) MG Oral Tab EC     Omeprazole 20 MG Oral Tab EC     ondansetron 4 MG Oral Tablet Dispersible     Gastro Referral - In Network     CELIAC DISEASE SCREEN Reflex [E]     Sed Rate, Westergren (Automated)     C-Reactive Protein [E]     H. Pylori Stool Ag, EIA [E]      2. Iron deficiency  E61.1 CBC With Differential With Platelet     Iron And Tibc     Ferritin     Ferrous Sulfate 324 (65 Fe) MG Oral Tab EC     Omeprazole 20 MG Oral Tab EC     ondansetron 4 MG Oral Tablet Dispersible     Gastro Referral - In Network     CELIAC DISEASE SCREEN Reflex [E]     Sed Rate, Westergren (Automated)     C-Reactive Protein [E]     H. Pylori Stool Ag, EIA [E]      3. Prediabetes  R73.03 CBC With Differential With Platelet     Iron And Tibc     Ferritin     Ferrous Sulfate 324 (65 Fe) MG Oral Tab EC     Omeprazole 20 MG Oral Tab EC     ondansetron 4 MG Oral Tablet Dispersible     Gastro Referral - In Network     CELIAC DISEASE SCREEN Reflex [E]     Sed Rate, Westergren (Automated)     C-Reactive Protein [E]     H. Pylori Stool Ag, EIA [E]      4. Class 1 obesity with body mass index (BMI) of 31.0 to 31.9 in adult, unspecified obesity type, unspecified whether serious comorbidity present  E66.811 CBC With Differential With Platelet    Z68.31 Iron And Tibc     Ferritin     Ferrous Sulfate 324 (65 Fe) MG Oral Tab EC     Omeprazole 20 MG Oral Tab EC     ondansetron 4 MG Oral Tablet Dispersible     Gastro Referral - In Network     CELIAC DISEASE SCREEN Reflex [E]     Sed Rate, Westergren (Automated)     C-Reactive Protein [E]     H. Pylori Stool  Ag, EIA [E]           Plan:  Patient has lost 17 lbs # since LOV. Patient has lost a total weight loss of  19 lbs # since first weight loss consult.  Labs reviewed  Advised to continue with low carb diet   Goal is  g per day  Advised to read nutrition labels  Log in carbs if possible   Increase protein intake   exercise goal is 1 hour 3 times per week cardio and strength training   discussed challenges with weight loss   Weigh once a week at least   Avoid sugary drinks or artificially sweetened drinks  Water intake goal is 64 oz per day   Goal weight loss is 8 lbs in 3 months   Discussed Options of stopping the Zepbound since vomiting at times, patient reports there is no vomiting since the last week of November  Advised lifestyle changes to help with the nausea  Zofran as needed  Advised to start omeprazole until she gets to see the gastroenterologist  Discussed the importance of seeing the gastroenterologist for further evaluation of iron deficiency and the blood in the stool  Advised to complete H. pylori and celiac testing( before she start PPI)      Plan:  Nutrition: low carb diet   Referral RD/nutritionist : no   Behavior:  Motivational interviewing performed      Discussed strategies to overcome habits/challenges       Reviewed:  Nutrition and the importance of regular protein intake  Labs ordered:    no  Treatment plan         Follow up in 2 months for weight loss and repeating blood work     I spent 30 minutes at the day of the service seeing the patient, examination, reviewing labs, independently interpreting results, completing charting and counseling the patient and/or on coordination of care.  The diagnosis, prognosis, and general treatment was explained to the patient.   Please return to the clinic if you are having persistent or worsening symptoms   Geena Denton MD,   Diplomate of the American Board of Internal Medicine  Diplomate of the American Board of Obesity Medicine

## 2025-01-16 ENCOUNTER — PATIENT MESSAGE (OUTPATIENT)
Dept: INTERNAL MEDICINE CLINIC | Facility: CLINIC | Age: 25
End: 2025-01-16

## 2025-01-16 DIAGNOSIS — H01.009 BLEPHARITIS, UNSPECIFIED LATERALITY, UNSPECIFIED TYPE: Primary | ICD-10-CM

## 2025-01-16 DIAGNOSIS — H04.129 DRY EYE: ICD-10-CM

## 2025-02-07 RX ORDER — TIRZEPATIDE 5 MG/.5ML
5 INJECTION, SOLUTION SUBCUTANEOUS WEEKLY
Qty: 6 ML | Refills: 0 | Status: SHIPPED | OUTPATIENT
Start: 2025-02-07

## 2025-03-12 ENCOUNTER — TELEPHONE (OUTPATIENT)
Dept: INTERNAL MEDICINE CLINIC | Facility: CLINIC | Age: 25
End: 2025-03-12

## 2025-03-12 NOTE — TELEPHONE ENCOUNTER
Patient's mother called the office.       Dr. Wallace's office doesn't have her daughter's referral.     She is asking if the nurse can please check why the referral hasn't been sent to there office.

## 2025-04-29 ENCOUNTER — OFFICE VISIT (OUTPATIENT)
Dept: INTERNAL MEDICINE CLINIC | Facility: CLINIC | Age: 25
End: 2025-04-29
Payer: COMMERCIAL

## 2025-04-29 VITALS
OXYGEN SATURATION: 97 % | HEIGHT: 61 IN | BODY MASS INDEX: 31.72 KG/M2 | WEIGHT: 168 LBS | SYSTOLIC BLOOD PRESSURE: 116 MMHG | DIASTOLIC BLOOD PRESSURE: 78 MMHG | HEART RATE: 100 BPM

## 2025-04-29 DIAGNOSIS — E61.1 IRON DEFICIENCY: ICD-10-CM

## 2025-04-29 DIAGNOSIS — E66.811 OBESITY, CLASS 1: ICD-10-CM

## 2025-04-29 DIAGNOSIS — E55.9 VITAMIN D DEFICIENCY: ICD-10-CM

## 2025-04-29 DIAGNOSIS — F41.1 GAD (GENERALIZED ANXIETY DISORDER): ICD-10-CM

## 2025-04-29 DIAGNOSIS — R73.03 PREDIABETES: Primary | ICD-10-CM

## 2025-04-29 PROCEDURE — 3078F DIAST BP <80 MM HG: CPT | Performed by: INTERNAL MEDICINE

## 2025-04-29 PROCEDURE — 3008F BODY MASS INDEX DOCD: CPT | Performed by: INTERNAL MEDICINE

## 2025-04-29 PROCEDURE — 99214 OFFICE O/P EST MOD 30 MIN: CPT | Performed by: INTERNAL MEDICINE

## 2025-04-29 PROCEDURE — 3074F SYST BP LT 130 MM HG: CPT | Performed by: INTERNAL MEDICINE

## 2025-04-29 RX ORDER — NALTREXONE HYDROCHLORIDE AND BUPROPION HYDROCHLORIDE 8; 90 MG/1; MG/1
TABLET, EXTENDED RELEASE ORAL
Qty: 120 TABLET | Refills: 0 | Status: SHIPPED | OUTPATIENT
Start: 2025-04-29 | End: 2025-04-29

## 2025-04-29 RX ORDER — NALTREXONE HYDROCHLORIDE AND BUPROPION HYDROCHLORIDE 8; 90 MG/1; MG/1
TABLET, EXTENDED RELEASE ORAL
Qty: 120 TABLET | Refills: 0 | Status: SHIPPED | OUTPATIENT
Start: 2025-04-29 | End: 2025-05-29

## 2025-04-29 NOTE — PATIENT INSTRUCTIONS
Exercise: Adding Intensity  You have been exercising for 30 minutes most days of the week. Now you can move on to the next stage: increasing the intensity. This means doing your activity in one or more of these ways:  Longer. Exercise for 30 minutes or more without a break.  Faster. Hike, run, or skate fast enough to raise your heart rate moderately--as if you had walked fast to catch a bus.  More often. Do your activity 4 to 6 times a week instead of 1 to 3 times.    Not just gym class  Be creative. You can reach your health and fitness goals in many ways. Try some of these activities:  Team sports, like basketball or soccer  Social or recreational activities, like hiking or dancing  Individual exercise, like cycling, swimming, or skating  Group fitness classes, like aerobic classes or weight training  Safety first  Whatever activity you choose, think about safety:  Wear the right safety gear and shoes for your activity.  Drink plenty of water during and after workouts.  Wear light-colored clothing if you’re out when it’s dark.  Make time to warm up before you exercise and cool down after.  Carry ID (identification) with you if you’re out alone. And be sure someone knows where you’re going.  If you’re on foot, travel against traffic (except on blind corners). If you’re on a bike, go with traffic. Obey the rules of the road.   Tips for sticking with it  Find a workout partner or sports club. If you know someone is expecting you, you’ll be less likely to skip your workout.  Pack a workout bag with everything you need. Then it’s ready when you are.  Choose a few different activities so you’ll stay interested. Make it fun!  What will help you to stick with it?  1.   2.   3.   Date Last Reviewed: 8/13/2015  © 0352-9914 [employer]. [address] [place] [address], [place] [address]. All rights reserved. This information is not intended as a substitute for professional medical care. Always follow your healthcare  professional's instructions.       ARLETH Lutzcome to [facility]. We are excited that you are committed to improving your health and have invited our practice to be part of your journey. Our approach to the medical management of weight loss is similar to that of other chronic diseases, like asthma or high blood pressure. Treatment is tailored to your needs and may look different than someone else in our program. Weight-loss success is dependent on many factors, including your motivation and commitment to better health.      We are committed to your medical safety, particularly when prescribing weight-loss medications. Because we are physicians, we measure your success not only by “pounds lost” - we will also track improvements in your laboratory work, vital signs and quality of life.      Weight loss and maintenance can be a challenging endeavor. We want to celebrate your successes and support you if you encounter difficult times. Please don’t hesitate to ask us questions and share with us any struggles you may have. For some patients, there may be many attempts at weight loss before lasting success is found, but we can help you find your success!      Sincerely,     Geena Denton MD  American Board of Obesity Medicine Diplomate  American Board of Internal Medicine Diplomate                                                                  Weight Loss Tips    Cut down on sugar and starches.    Ok to eat healthy fat ( avocado, nuts,eggs, olive oil and coconut oil)    Eat protein with each meal target 15-30 G of protein with each meal: Protein reduces appetite, cravings and hunger. It increases muscle mass and subsequently metabolism and fat burning.     If not able to meet your protein need, you can get a protein shake. Choose one with around 25 g of protein and low carb less than 5 g ( e.g: premier protein, orgain Clean Protein, whey protein muscle milk)    Limit carbohydrates between 50g-100g /  day    Limit processed food, eat unprocessed food whenever you can.    Read nutrition labels    Drink a lot of water  at least 65 oz of water per day: Water is a natural appetite suppressant, increases calorie burning and help you to loose weight.    Eat slowly.    Do not drink your calories ( avoid soft drinks, juice, high calorie coffee drinks, limit alcohol) Also stay away from artificially sweetened beverages too it can cause weight gain.    Think about challenges and write it down to address it next visit.     Do not eat late at night.      Exercise goal for weight loss is 150 minutes per week.    Take a probiotic everyday ( e.g: blackman colon health brand, culturelle, VSL3, Lactobacillus Gasseri )     Use smart phone applications to track your progress/ carb intake e.g:( my fitnesspal, loose it, Carb Manager )    Have a good night sleep aim for 7-8 hours    Reduce your stress level.    Helpful websites: www.diet[employer].Formatta( search visual low-carb guides dietdoctor), or www.[employer].Formatta.    Helpful exercise apps( [employer] altagracia)   Helpful fasting apps :( Zero)         Foods to avoid :  Sugar: sweets, ice cream, fruit juices, candy and food that is high in added sugar.  Highly processed food  Refined grains: Rice, wheat, bread, cereal and pasta.  Starchy vegetables: Potatoes and corn     Low Carb food :  Meat: lamb, steak, chicken, turkey  Fish and sea food   Eggs  Plain yogurt   Cheese   Fat and oils   Fruits: berries are the best           How I Plan to Lose Weight      Goal setting is the “how” of weight loss. Motivators are the “why.” When setting goals, utilize the SMART technique:    SMART Technique Example   Specific Who, what, where, when, how… “I want to lose 10 pounds in two months.”   Measureable How will you track? 10 pounds in 8 weeks = 1.25 pounds/week   Attainable Resources you have available, previous experience “I have been able to do this before, and now I have new tools from my doctor!”    Relevant Why this goal is important Review your motivators above   Timely Set benchmarks and deadlines “Focusing for two month intervals works for me.”         Even if your lose 0.5 pound per week You will still lose 26                       pounds by this time next year!

## 2025-04-29 NOTE — PROGRESS NOTES
Arleth Gonzalez is a 24 year old female.    Chief complaint:weight loss follow up , medication refill, therapeutic drug monitoring    HPI:   ARLETH here for follow up on weight loss   Wt Readings from Last 12 Encounters:   04/29/25 168 lb (76.2 kg)   12/07/24 167 lb 12.8 oz (76.1 kg)   10/01/24 184 lb 9.6 oz (83.7 kg)   08/21/24 189 lb 6.4 oz (85.9 kg)   05/07/24 190 lb (86.2 kg)   10/09/23 186 lb 9.6 oz (84.6 kg)   09/11/23 184 lb 6.4 oz (83.6 kg)   07/05/22 179 lb 3.2 oz (81.3 kg) (94%, Z= 1.57)*   08/19/20 173 lb (78.5 kg) (94%, Z= 1.55)*   03/05/20 165 lb (74.8 kg) (92%, Z= 1.41)*   09/30/19 168 lb 9.6 oz (76.5 kg) (93%, Z= 1.51)*   05/11/19 161 lb (73 kg) (91%, Z= 1.37)*     * Growth percentiles are based on CDC (Girls, 2-20 Years) data.     Starting weight: 186 lbs   Total weight loss: 18 lbs   Medication: hasnot taken zepbound since January 2025    Typical diet   Breakfast: Lunch: Dinner: Snacks:   Coffee     Relatively smaller   Digestive   Fourth of the cookie     Trying to focus on protein   Rice    Protein and  rice    A little more that she used to      Mindful of the nutrition labels     Soda/ juice/ alcohol: since Ramadan has been drinking soda     Exercise: No  Challenges: side effects and Ramadan   Side effect of medication: nausea and vomiting   Score to self ( how well she is doing ):2/10    Recently has been trying to get out more and do more activities   Made some changes when working   Long walks between buildings       Prediabetes   She is due to repeat     Iron def   Didn't go for the repeat blood work     Current Medications[1]   Past Medical History[2]  Past Surgical History[3]     Social History:  Short Social Hx on File[4]   Family History[5]  Problem List[6]            REVIEW OF SYSTEMS:   A comprehensive 10 point review of systems was completed.  Pertinent positives and negatives noted in the the HPI          PHYSICAL EXAM:   /78   Pulse 100   Ht 5' 1\" (1.549 m)   Wt  168 lb (76.2 kg)   LMP 11/21/2024 (Exact Date)   SpO2 97%   BMI 31.74 kg/m²   GENERAL: well developed, well nourished,in no apparent distress  LUNGS: clear to auscultation  CARDIO: RRR without murmur  NEURO: no gross deficits              No orders of the defined types were placed in this encounter.        ASSESSMENT/PLAN:       ICD-10-CM    1. Prediabetes  R73.03 Hemoglobin A1C (Glycohemoglobin) [E]     Vitamin D [E]     Naltrexone-buPROPion HCl ER (CONTRAVE) 8-90 MG Oral Tablet 12 Hr     DISCONTINUED: Naltrexone-buPROPion HCl ER (CONTRAVE) 8-90 MG Oral Tablet 12 Hr      2. ROBIN (generalized anxiety disorder)  F41.1 Hemoglobin A1C (Glycohemoglobin) [E]     Vitamin D [E]     Naltrexone-buPROPion HCl ER (CONTRAVE) 8-90 MG Oral Tablet 12 Hr     DISCONTINUED: Naltrexone-buPROPion HCl ER (CONTRAVE) 8-90 MG Oral Tablet 12 Hr      3. Vitamin D deficiency  E55.9 Hemoglobin A1C (Glycohemoglobin) [E]     Vitamin D [E]     Naltrexone-buPROPion HCl ER (CONTRAVE) 8-90 MG Oral Tablet 12 Hr      4. Iron deficiency  E61.1 Hemoglobin A1C (Glycohemoglobin) [E]     Vitamin D [E]     Naltrexone-buPROPion HCl ER (CONTRAVE) 8-90 MG Oral Tablet 12 Hr      5. Obesity, class 1  E66.811 Hemoglobin A1C (Glycohemoglobin) [E]     Vitamin D [E]     Naltrexone-buPROPion HCl ER (CONTRAVE) 8-90 MG Oral Tablet 12 Hr         Plan:  Patient has lost and gained # since LOV. Patient has lost a total weight loss of 18 lbs # since first weight loss consult.  Labs reviewed  Advised to continue with low carb diet   Goal is less than 100 g per day   Advised to read nutrition labels  Log in carbs if possible   Increase protein intake   exercise goal is 1 hour 3 times per week cardio and strength training   discussed challenges with weight loss   Weigh once a week at least   Avoid sugary drinks or artificially sweetened drinks  Water intake goal is 64 oz per day   Weight loss is 8 pounds in 3 months  Did not tolerate Zepbound due to side effects  Will start  Contrave discussed how to take it, no history of seizure  Discussed side effects  Advised to complete blood work as ordered      Plan:  Nutrition: low carb diet   Referral RD/nutritionist : no  Behavior:  Motivational interviewing performed      Discussed strategies to overcome habits/challenges       Reviewed:  Nutrition and the importance of regular protein intake  Labs ordered:   yes   Treatment plan   I spent 30 minutes at the day of the service seeing the patient, examination, reviewing labs, independently interpreting results, completing charting and counseling the patient and/or on coordination of care.  The diagnosis, prognosis, and general treatment was explained to the patient.   Please return to the clinic if you are having persistent or worsening symptoms   Geena Denton MD,   Diplomate of the American Board of Internal Medicine  Diplomate of the American Board of Obesity Medicine            [1]   Current Outpatient Medications   Medication Sig Dispense Refill    ondansetron 4 MG Oral Tablet Dispersible Take 1 tablet (4 mg total) by mouth every 8 (eight) hours as needed. 30 tablet 0    ZEPBOUND 5 MG/0.5ML Subcutaneous Solution Auto-injector ADMINISTER 5 MG UNDER THE SKIN 1 TIME A WEEK (Patient not taking: Reported on 4/29/2025) 6 mL 0    Tirzepatide-Weight Management (ZEPBOUND) 5 MG/0.5ML Subcutaneous Solution Auto-injector Inject 5 mg into the skin once a week. (Patient not taking: Reported on 4/29/2025) 6 mL 0   [2]   Past Medical History:   Allergic conjunctivitis of both eyes    High Myopia of both eyes with regular astigmatism   [3] No past surgical history on file.  [4]   Social History  Socioeconomic History    Marital status: Single   Tobacco Use    Smoking status: Never    Smokeless tobacco: Never   Substance and Sexual Activity    Alcohol use: No     Alcohol/week: 0.0 standard drinks of alcohol    Drug use: No    Sexual activity: Never   Other Topics Concern    Pt has a pacemaker No    Pt has  a defibrillator No    Reaction to local anesthetic No   Social History Narrative    School:        Grade:9th    Sports:   [5]   Family History  Problem Relation Age of Onset    Migraines Father     Diabetes Father     Lipids Father     Depression Father     Allergies Sister     Colon Cancer Other         family h/o colon cancer    Psoriasis Paternal Grandmother     Anxiety Paternal Grandmother     Cancer Paternal Grandmother         psoriasis    Glaucoma Paternal Grandmother     Other (pica) Paternal Aunt     Schizophrenia Paternal Aunt     Macular degeneration Neg    [6]   Patient Active Problem List  Diagnosis    Overweight child    High Myopia of both eyes with regular astigmatism    Allergic conjunctivitis of both eyes    Blepharitis of upper and lower eyelids of both eyes    Obesity    Anxiety    Elevated blood pressure reading    Headache, unspecified headache type    Intestinal infection    Vitamin D deficiency    Enlarged thyroid    Meibomian gland dysfunction (MGD) of both eyes    Abnormal weight gain    ROBIN (generalized anxiety disorder)    Prediabetes    Annual physical exam

## 2025-05-08 DIAGNOSIS — E61.1 IRON DEFICIENCY: ICD-10-CM

## 2025-05-08 DIAGNOSIS — E66.811 OBESITY, CLASS 1: ICD-10-CM

## 2025-05-08 DIAGNOSIS — R73.03 PREDIABETES: ICD-10-CM

## 2025-05-08 DIAGNOSIS — F41.1 GAD (GENERALIZED ANXIETY DISORDER): ICD-10-CM

## 2025-05-08 DIAGNOSIS — E55.9 VITAMIN D DEFICIENCY: ICD-10-CM

## 2025-05-10 RX ORDER — NALTREXONE HYDROCHLORIDE AND BUPROPION HYDROCHLORIDE 8; 90 MG/1; MG/1
TABLET, EXTENDED RELEASE ORAL
Qty: 120 TABLET | Refills: 0 | OUTPATIENT
Start: 2025-05-10

## 2025-05-16 ENCOUNTER — PATIENT MESSAGE (OUTPATIENT)
Dept: INTERNAL MEDICINE CLINIC | Facility: CLINIC | Age: 25
End: 2025-05-16

## 2025-05-16 DIAGNOSIS — T78.40XA ALLERGY, INITIAL ENCOUNTER: Primary | ICD-10-CM

## 2025-05-31 ENCOUNTER — HOSPITAL ENCOUNTER (OUTPATIENT)
Age: 25
Discharge: HOME | End: 2025-05-31
Payer: COMMERCIAL

## 2025-05-31 VITALS
OXYGEN SATURATION: 96 % | TEMPERATURE: 97 F | DIASTOLIC BLOOD PRESSURE: 94 MMHG | SYSTOLIC BLOOD PRESSURE: 138 MMHG | HEART RATE: 75 BPM | RESPIRATION RATE: 16 BRPM

## 2025-05-31 DIAGNOSIS — J02.9 VIRAL PHARYNGITIS: Primary | ICD-10-CM

## 2025-05-31 DIAGNOSIS — J00 COMMON COLD: ICD-10-CM

## 2025-05-31 LAB — S PYO AG THROAT QL IA.RAPID: NEGATIVE

## 2025-05-31 PROCEDURE — 99212 OFFICE O/P EST SF 10 MIN: CPT

## 2025-05-31 PROCEDURE — 99213 OFFICE O/P EST LOW 20 MIN: CPT

## 2025-05-31 PROCEDURE — 87651 STREP A DNA AMP PROBE: CPT

## 2025-05-31 NOTE — ED INITIAL ASSESSMENT (HPI)
Patient reports sore throat Wednesday.  + painful swallow on Thursday.  Also with headache, body aches and ear congestion.  T max 99.8 today.

## 2025-05-31 NOTE — DISCHARGE INSTRUCTIONS
Strep testing is normal.  Antibiotics are not indicated at this time.  You had a good exam. The back of your throat had some postnasal drip, and is slightly pinker than usual, but it does not very red, and there is no pustular's concerning for strep throat at this time.  Continue doing your Flonase, and other over-the-counter care.  Continue Tylenol as needed for pain unless you are doing TheraFlu, or Mucinex.  This already has Tylenol in it so do not double dose yourself.  You can take over-the-counter ibuprofen for inflammation, and breakthrough pain.   Avoid dairy products such as milk, cheese, creamer, etc.  This increases mucus production, and inflammation.  Call Dr. Denton  on Monday for a follow-up appointment

## 2025-05-31 NOTE — ED PROVIDER NOTES
Patient Seen in: [facility]      History  Chief Complaint   Patient presents with    Sore Throat     Stated Complaint: Sore throat, cough, ear pain, fever    Subjective:   HPI          Jeana Gonzalez is a 24 year old female accompanied by parent here for sore throat, cough, body aches, and ear pain.  Elevated temp 99 prior today, but no active fever.         Objective:     Past Medical History:    Allergic conjunctivitis of both eyes    High Myopia of both eyes with regular astigmatism              History reviewed. No pertinent surgical history.             Social History     Socioeconomic History    Marital status: Single   Tobacco Use    Smoking status: Never    Smokeless tobacco: Never   Substance and Sexual Activity    Alcohol use: No     Alcohol/week: 0.0 standard drinks of alcohol    Drug use: No    Sexual activity: Never   Other Topics Concern    Pt has a pacemaker No    Pt has a defibrillator No    Reaction to local anesthetic No   Social History Narrative    School:        Grade:9th    Sports:              Review of Systems    Positive for stated complaint: Sore throat, cough, ear pain, fever  Other systems are as noted in HPI.  Constitutional and vital signs reviewed.      All other systems reviewed and negative except as noted above.                  Physical Exam    ED Triage Vitals [05/31/25 1322]   BP (!) 138/94   Pulse 75   Resp 16   Temp 97 °F (36.1 °C)   Temp src Oral   SpO2 96 %   O2 Device None (Room air)       Current Vitals:   Vital Signs  BP: (!) 138/94  Pulse: 75  Resp: 16  Temp: 97 °F (36.1 °C)  Temp src: Oral    Oxygen Therapy  SpO2: 96 %  O2 Device: None (Room air)            Physical Exam  Vitals and nursing note reviewed.   Constitutional:       General: She is not in acute distress.     Appearance: Normal appearance. She is well-developed. She is not toxic-appearing.   HENT:      Head: Normocephalic.      Right Ear: Tympanic membrane, ear canal and external ear  normal.      Left Ear: Tympanic membrane, ear canal and external ear normal.      Nose: Nose normal.      Comments: No congestion, or purulent nasal drainage.     Mouth/Throat:      Mouth: Mucous membranes are moist.      Pharynx: Oropharynx is clear.      Comments: Injected posterior pharynx.  Not beefy red in nature.  Eyes:      Extraocular Movements: Extraocular movements intact.      Conjunctiva/sclera: Conjunctivae normal.      Pupils: Pupils are equal, round, and reactive to light.   Cardiovascular:      Rate and Rhythm: Normal rate.      Pulses: Normal pulses.   Pulmonary:      Effort: Pulmonary effort is normal.   Musculoskeletal:         General: Normal range of motion.      Cervical back: No rigidity or tenderness.   Lymphadenopathy:      Cervical: No cervical adenopathy.   Skin:     Capillary Refill: Capillary refill takes less than 2 seconds.   Neurological:      General: No focal deficit present.      Mental Status: She is alert and oriented to person, place, and time.   Psychiatric:         Mood and Affect: Mood normal.         Behavior: Behavior normal.         Thought Content: Thought content normal.         Judgment: Judgment normal.                 ED Course  Labs Reviewed   RAPID STREP A - Normal                            MDM            Medical Decision Making    Ddx: URI vs LRI, allergies, reactive, COVID, FLU, RSV, strep, or somatic causes of symptoms    Tx for: Viral pharyngitis related to common cold.  Strep testing negative.  No overt clinical evidence of strep at this time.  Antibiotics are not indicated.  Continue doing home care/OTC medications. cool mist humidifier, and oral hydration.    Avoid dairy if possible; This increases mucus production.  No stridor, No hot muffled speech, and no signs of compromise. Tolerating PO. Neuro wnl.   Reinforced ER precautions, and f/u care as needed. All questions answered, and reassurance given. No acute distress and cleared for home.      Problems  Addressed:  Common cold: acute illness or injury  Viral pharyngitis: acute illness or injury    Amount and/or Complexity of Data Reviewed  Independent Historian: parent  External Data Reviewed: notes.  Labs: ordered. Decision-making details documented in ED Course.     Details: Independent interpretation. Reviewed with patient     Risk  OTC drugs.        Disposition and Plan     Clinical Impression:  1. Viral pharyngitis    2. Common cold         Disposition:  Discharge  5/31/2025  1:20 pm    Follow-up:  No follow-up provider specified.        Medications Prescribed:  Current Discharge Medication List                Supplementary Documentation:

## 2025-07-28 ENCOUNTER — OFFICE VISIT (OUTPATIENT)
Dept: OTOLARYNGOLOGY | Facility: CLINIC | Age: 25
End: 2025-07-28
Payer: COMMERCIAL

## 2025-07-28 VITALS — HEIGHT: 61 IN | WEIGHT: 172.81 LBS | BODY MASS INDEX: 32.63 KG/M2

## 2025-07-28 DIAGNOSIS — J30.89 ALLERGIC RHINITIS DUE TO DUST: ICD-10-CM

## 2025-07-28 DIAGNOSIS — J30.1 SEASONAL ALLERGIC RHINITIS DUE TO POLLEN: Primary | ICD-10-CM

## 2025-07-28 DIAGNOSIS — J34.2 NASAL SEPTAL DEVIATION: ICD-10-CM

## 2025-07-28 DIAGNOSIS — M26.623 BILATERAL TEMPOROMANDIBULAR JOINT PAIN: ICD-10-CM

## 2025-07-28 PROCEDURE — 99203 OFFICE O/P NEW LOW 30 MIN: CPT | Performed by: SPECIALIST

## 2025-07-28 PROCEDURE — 3008F BODY MASS INDEX DOCD: CPT | Performed by: SPECIALIST

## 2025-07-28 NOTE — PATIENT INSTRUCTIONS
I believe your nasal congestion is most likely secondary to allergies as you do have a mild left septal deviation.  No evidence of purulence or polyps.  Your skin testing in the past was positive to trees, weeds, dust, dust mite, dog, and cockroach.  These were done in 2016.  I would recommend a repeat skin testing and possible immunotherapy.  You can continue your Allegra and Flonase for now.  I believe that you had a component of TMJ arthralgia.  You were given a handout for this.  Heat, soft diet (only foods you can cut with a fork), aspirin, advil, aleve, or motrin.  No gum chewing.  Consider a dental splint if grinding.   If this is not helpful, you can consider TMJ therapy.

## 2025-07-28 NOTE — PROGRESS NOTES
Jeana Gonzalez is a 24 year old female.   Chief Complaint   Patient presents with    Nasal Congestion     Patient having trouble with nasal congestions, allergies,with sinus pressure and headaches.     HPI:   Patient here with nasal obstruction.  She did have allergy skin testing done in 2016.  This was positive for trees, weeds, dust mite, dog, and cockroach.  She currently is on Allegra and uses Flonase on an as needed basis.      Current Medications[1]   Past Medical History[2]   Social History:  Short Social Hx on File[3]     REVIEW OF SYSTEMS:   GENERAL HEALTH: feels well otherwise  GENERAL : denies fever, chills, sweats, weight loss, weight gain  SKIN: denies any unusual skin lesions or rashes  RESPIRATORY: denies shortness of breath with exertion  NEURO: denies headaches    EXAM:   Ht 5' 1\" (1.549 m)   Wt 172 lb 12.8 oz (78.4 kg)   LMP 11/21/2024 (Exact Date)   BMI 32.65 kg/m²   System Details   Skin Inspection - Normal.   Constitutional Overall appearance - Normal.   Head/Face Facial features - Normal. Eyebrows - Normal. Skull - Normal.   Eyes Conjunctiva - Right: Normal, Left: Normal. Pupil - Right: Normal, Left: Normal.    Ears Inspection - Right: Normal, Left: Normal.   Canal - Right: Normal, Left: Normal.   TM - Right: Normal, Left: Normal.   Nasal External nose - Normal.   Consent was obtained.  The nasal cavity was anesthetized with 1% neosynephrine and 4% lidocaine.  The bilateral nares were examined from the nasal vestibule to the nasopharynx.  Areas examined include the nasal floor, turbinates, superior, middle, and inferior meatus, the nasal septum, sphenoethmoid recess, eustation tube and nasopharynx.  All abnormalities are listed in the exam section.  Slight left septal deviation.  Turbinate congestion.  No purulence or polyps seen on either side by fiberoptic exam.     Oral/Oropharynx Lips - Normal, Tonsils - Normal, Tongue - Normal   Tenderness of the bilateral condylar heads and  bilateral pterygoid muscles.   Neck Exam Inspection - Normal. Palpation - Normal. Parotid gland - Normal. Thyroid gland - Normal.   Lymph Detail Submental. Submandibular. Anterior cervical. Posterior cervical. Supraclavicular all without enlargement   Psychiatric Orientation - Oriented to time, place, person & situation. Appropriate mood and affect.   Neurological Memory - Normal. Cranial nerves - Cranial nerves II through XII grossly intact.     ASSESSMENT AND PLAN:   1. Seasonal allergic rhinitis due to pollen  Both seasonal as well as dust and dog allergies.  Patient currently on Allegra.  Her last allergy testing was 9 years ago, as this is the case, I believe she would benefit from repeat testing and possible immunotherapy.    2. Allergic rhinitis due to dust      3. Nasal septal deviation  To the left    4. Bilateral temporomandibular joint pain  Patient given a handout for TMJ arthralgia.  Can also consider TMJ therapy.      The patient indicates understanding of these issues and agrees to the plan.      Brandi Morejon MD  7/28/2025  6:38 PM       [1]   Current Outpatient Medications   Medication Sig Dispense Refill    ondansetron 4 MG Oral Tablet Dispersible Take 1 tablet (4 mg total) by mouth every 8 (eight) hours as needed. 30 tablet 0    ZEPBOUND 5 MG/0.5ML Subcutaneous Solution Auto-injector ADMINISTER 5 MG UNDER THE SKIN 1 TIME A WEEK (Patient not taking: Reported on 7/28/2025) 6 mL 0    Tirzepatide-Weight Management (ZEPBOUND) 5 MG/0.5ML Subcutaneous Solution Auto-injector Inject 5 mg into the skin once a week. (Patient not taking: Reported on 7/28/2025) 6 mL 0   [2]   Past Medical History:   Allergic conjunctivitis of both eyes    High Myopia of both eyes with regular astigmatism   [3]   Social History  Socioeconomic History    Marital status: Single   Tobacco Use    Smoking status: Never    Smokeless tobacco: Never   Vaping Use    Vaping status: Never Used   Substance and Sexual Activity    Alcohol  use: No     Alcohol/week: 0.0 standard drinks of alcohol    Drug use: No    Sexual activity: Never   Other Topics Concern    Pt has a pacemaker No    Pt has a defibrillator No    Reaction to local anesthetic No   Social History Narrative    School:        Grade:9th    Sports:

## (undated) NOTE — LETTER
May 13, 2023      No Recipients     Patient: Esthela Burnette   YOB: 2000   Date of Visit: 5/13/2023       Dear Dr. Terrance Burkitt Recipients: Thank you for referring Veronica Friend to me for evaluation. Here is my assessment and plan of care:    Esthela Burnette is a 23year old female. HPI:     HPI    Pt is here for a complete eye exam. Pt states vision is stable. She would like an updated glasses Rx today. Pt complains of irritation on LLL x a few weeks. Pt states she occasionally does warm compresses. Per Dr Parveen Grady   Last edited by Javy Buchanan OT on 5/13/2023 10:12 AM.        Patient History:  Past Medical History:   Diagnosis Date    Allergic conjunctivitis of both eyes 6/24/2019    High Myopia of both eyes with regular astigmatism 6/24/2019       Surgical History: Esthela Burnette has no past surgical history on file. Family History   Problem Relation Age of Onset    Migraines Father     Diabetes Father     Lipids Father     Depression Father     Allergies Sister     Colon Cancer Other         family h/o colon cancer    Psoriasis Paternal Grandmother     Anxiety Paternal Grandmother     Cancer Paternal Grandmother         psoriasis    Glaucoma Paternal Grandmother     Other (pica) Paternal Aunt     Schizophrenia Paternal Aunt     Macular degeneration Neg        Social History:   Social History     Socioeconomic History    Marital status: Single   Tobacco Use    Smoking status: Never    Smokeless tobacco: Never   Substance and Sexual Activity    Alcohol use: No     Alcohol/week: 0.0 standard drinks of alcohol    Drug use: No    Sexual activity: Never   Other Topics Concern    Pt has a pacemaker No    Pt has a defibrillator No    Reaction to local anesthetic No   Social History Narrative    School:        Grade:9th    Sports:       Medications:  No current outpatient medications on file.        Allergies:    Amoxicillin             DIARRHEA, PAIN    ROS:     ROS Positive for: Eyes  Negative for: Constitutional, Gastrointestinal, Neurological, Skin, Genitourinary, Musculoskeletal, HENT, Endocrine, Cardiovascular, Respiratory, Psychiatric, Allergic/Imm, Heme/Lymph  Last edited by Dafne Butler OT on 5/13/2023 10:07 AM.          PHYSICAL EXAM:     Base Eye Exam       Visual Acuity (Snellen - Linear)         Right Left    Dist cc 20/30 +3 20/30    Dist ph cc 20/20 20/25    Near cc 20/20 20/20              Tonometry (Icare, 10:19 AM)         Right Left    Pressure 18 19              Pupils         Pupils    Right PERRL    Left PERRL              Visual Fields         Left Right     Full Full              Extraocular Movement         Right Left     Full, Ortho Full, Ortho              Dilation       Both eyes: 1.0% Mydriacyl and 2.5% Florentino Synephrine @ 10:19 AM                  Slit Lamp and Fundus Exam       Slit Lamp Exam         Right Left    Lids/Lashes Meibomian gland dysfunction, 1+ Scurf,  Meibomian gland dysfunction, 1+ Scurf, blocked oil gland LLL    Conjunctiva/Sclera Normal Normal    Cornea Clear Clear    Anterior Chamber Deep and quiet Deep and quiet    Iris Normal Normal    Lens Clear Clear    Vitreous Clear Clear              Fundus Exam         Right Left    Disc Sloping margin, Peripapillary atrophy, Tilted disc Sloping margin, Peripapillary atrophy, Tilted disc    C/D Ratio 0.5 0.5    Macula Normal Normal    Vessels Normal Normal    Periphery Normal Normal                  Refraction       Wearing Rx         Sphere Cylinder Axis    Right -9.00 +2.00 075    Left -9.00 +1.75 090      Age: 4yrs    Type: Single vision              Cycloplegic Refraction         Sphere Cylinder [place]    Right -9.50 +2.25 075 20/20-    Left -9.75 +2.25 090 20/20-              Final Rx         Sphere Cylinder [place]    Right -9.50 +2.25 075 20/20-    Left -9.75 +2.25 090 20/20-      Type: Single vision   PD Measurement: 62.0                    ASSESSMENT/PLAN:     Diagnoses and Plan:     High Myopia of both eyes with regular astigmatism  New glasses Rx given. Suggest update. Recommend that patient take an \"eye break\" when they are working at the computer. Patient should take a break every 20 minutes, looking away from the computer for 20 seconds at the distance of 20 feet to prevent eye fatigue. Meibomian gland dysfunction (MGD) of both eyes  Patient was instructed to use warm compresses to the eyelids twice a day everyday. Instructions for warm compress use:   Patient should place wash compresses on both eyelids for 5 minutes every morning and every night. After 5 minutes of holding the warm compresses on the eyelids, patient should gently rub the eyelashes and then rinse thoroughly with warm water. No orders of the defined types were placed in this encounter. Meds This Visit:  Requested Prescriptions      No prescriptions requested or ordered in this encounter        Follow up instructions:  Return in about 2 years (around 5/13/2025) for EE.    5/13/2023  Scribed by: Sabas Rowan MD      If you have questions, please do not hesitate to call me. I look forward to following Jeana along with you.     Sincerely,        Sabas Rowan MD        CC:   No Recipients    Document electronically generated by: Sabas Rowan MD

## (undated) NOTE — LETTER
6/19/2019              Jeana Martinez 8         To whom it may concern,     Jailyn Stone is under my care for a few years. She needs to urinate frequently due high level stress situations including test taking, long car rides, social outings etc. This affects a great deal of her life. Please allow frequent bathroom breaks as need for high level stress. Please allow examination time to be paused during bathroom break to provide full examination time.           Sincerely,    Leah Prieto, DO [place] , [address] [facility], [address] [facility], [address] [place] [address]  [phone]

## (undated) NOTE — LETTER
9/22/2017              Jeana Martinez 8         Immunization History   Administered Date(s) Administered   • DTAP 11/04/2002, 01/27/2003, 04/22/2003, 09/03/2004, 08/07/2007   • HEP A,Ped/Adol,(2 Dose) 06/

## (undated) NOTE — LETTER
Name:  Moody Valerio Year:  12th Grade Class: Student ID No.:   Address:  [address] [place] [address] Phone:  [phone] (home) [phone] (work) :  18year old   Name Relationship [address] Work Phone Home Phone Mobile Phone polymorphic ventricular tachycardia? 13. Does anyone in your family have a heart problem, pacemaker, or implanted defibrillator? 12. Has anyone in your family had unexplained fainting, seizures, or near drowning?      BONE AND JOINT QUESTIONS Yes No 38. Have you ever had numbness, tingling, or weakness in your arms or legs after being hit or falling? 39.Have you ever been unable to move your arms / legs after being hit /fall? 40. Have you ever become ill while exercising in the heat?     41.  D Eyes/Ears/Nose/Throat:  Pupils equal    Hearing Yes    Lymph nodes Yes    Heart*  · Murmurs (auscultation standing, supine, +/- Valsalva)  · Location of point of maximal impulse (PMI) Yes    Pulses Yes    Lungs Yes    Abdomen Yes    Genitourinary (males on Protocol.  We have reviewed the policy and understand that I/our student may be asked to submit to testing for the presence of performance-enhancing substances in my/his/her body either during [employer] state series events or during the school day, and I/our su

## (undated) NOTE — LETTER
VACCINE ADMINISTRATION RECORD  PARENT / GUARDIAN APPROVAL  Date: 2018  Vaccine administered to: Rigoberto Mcknight     : 2000    MRN: [id number]    A copy of the appropriate Centers for Disease Control and Prevention Vaccine Information statement

## (undated) NOTE — LETTER
[address] [facility] [address]        On behalf of your primary doctor Jennifer Martinez DO, we have  attempted to reach you by phone to schedule a well child physical.      To provide you with the best possible care, please give

## (undated) NOTE — LETTER
June 24, 2019    Yao Adams DO  [address] [facility] [address]     Patient: Samir Justice   YOB: 2000   Date of Visit: 6/24/2019       Dear Dr. Rose Rojas DO:    Thank you for referring Samir Justice to me for evalu daily for 10-14 days Disp: 1 Tube Rfl: 0   triamcinolone acetonide 0.1 % External Ointment Apply 1 Application topically 2 (two) times daily.  Disp: 1 Tube Rfl: 0   FLUTICASONE PROPIONATE 50 MCG/ACT Nasal Suspension SPRAY ONCE NASALY DAILY Disp: 1 Inhaler R Right Left    Disc Sloping margin, Peripapillary atrophy, Tilted disc Sloping margin, Peripapillary atrophy, Tilted disc    C/D Ratio 0.3 0.3    Macula Normal Normal    Vessels Normal Normal    Periphery Normal Normal            Refraction     Wearing R

## (undated) NOTE — LETTER
[facility] [employer] of Child Health Examination       Student's Name  Martita Hobson Signature                                                                                                                                   Title         DO                  Date  9/24/2018   Signature Female School   Grade Level/ID#  11th Grade   HEALTH HISTORY          TO BE COMPLETED AND SIGNED BY PARENT/GUARDIAN AND VERIFIED BY HEALTH CARE PROVIDER    ALLERGIES  (Food, drug, insect, other)  Amoxicillin MEDICATION  (List all prescribed or taken on a r /79   Pulse 81   Ht 5' 0.25\" (1.53 m)   Wt 71.3 kg (157 lb 2 oz)   BMI 30.43 kg/m²     DIABETES SCREENING  BMI>85% age/sex  Yes And any two of the following:  Family History Yes    Ethnic Minority  No          Signs of Insulin Resistance (hypertensi Currently Prescribed Asthma Medication:            Quick-relief  medication (e.g. Short Acting Beta Antagonist): No          Controller medication (e.g. inhaled corticosteroid):   No Other   NEEDS/MODIFICATIONS required in the school setting  None DIET

## (undated) NOTE — LETTER
April 22, 2021    [facility], [address] [facility]     Patient: Kayleigh Khalil   YOB: 2000   Date of Visit: 4/22/2021       Dear Dr. Demar Coy MD:    Thank you for referring Kayleigh Khalil to me for evaluation. Endocrine, Cardiovascular, Respiratory, Psychiatric, Allergic/Imm, Heme/Lymph    Last edited by Nunu Peters O.T. on 4/22/2021  2:52 PM. (History)          PHYSICAL EXAM:     Base Eye Exam     Visual Acuity (Snellen - Linear)       Right Left    Dist c the eyelids twice a day everyday. Instructions for warm compress use:   Patient should place wash compresses on both eyelids for 5 minutes every morning and every night.   After 5 minutes of holding the warm compresses on the eyelids, patient should gent

## (undated) NOTE — MR AVS SNAPSHOT
[facility]  [facility], [address]  [phone]               Thank you for choosing us for your health care visit with OVI LAW DO.   We are glad to serve you and happy to provide you with this summary 96 lbs and over     20 ml                                                        4                        2                    1                            Ibuprofen/Advil/Motrin Dosing    Please dose by weight whenever possible  Ibuprofen is dosed every 6 · If eyes are involved significantly, use eye drops as needed in addition to above (prescription is best but if not covered, try Zaditor (for age 1 or greater)  General:  · Bathe and rinse hair at night after being outside - this rinses allergens off the b 123/80 mmHg 94 98.2 °F (36.8 °C) (Oral) 68.493 kg (151 lb) (91 %*, Z = 1.33) No     *Growth percentiles are based on CDC 2-20 Years data         Current Medications          This list is accurate as of: 4/14/17  3:43 PM.  Always use your most recent med l Call [phone] for help. MyChart is NOT to be used for urgent needs. For medical emergencies, dial 911.             Educational Information     Healthy Active Living  An initiative of the American Academy of Pediatrics    Fact Sheet: Healthy Active Help your children form healthy habits. Healthy active children are more likely to be healthy active adults!              Visit [facility] online at  [email or web address]

## (undated) NOTE — LETTER
6/19/2019              eJana Martinez 8         To whom it may concern,       Robert Russo is under my care for the last few years. Please allow frequent bathroom breaks as needed for exam related stress. Please allow examination time to be paused during bathroom break to provide full examination time.        Sincerely,          Eda Florentino, DO [facility] , [address] [facility], [address] [facility], [facility]  [phone]

## (undated) NOTE — LETTER
Dear Ms. Farrfartun Truong,    We have attempted to contact you as it is necessary to reschedule the following appointment:          Kevin Chisholm scheduled with Barry Chavez MD on 03/07/2022 at 03:40 PM.    Please call our office at [phone] as soon as possible so that we may reschedule the appointment for you. Even if you do not wish to reschedule at this time, a return phone call would be greatly appreciated so that documentation can be made. If you have already rescheduled the appointment, please disregard this letter. We apologize for any inconvenience. Thank you.       Sincerely,         Reception Staff for  Barry Chavez MD

## (undated) NOTE — LETTER
[facility] [email or web address] Child Health Examination       Student's Name  Nagi Hobson verifying above immunization history must sign below.   Signature                                                                                                                                   Title       DO                    Date  9/30/2019   Signature Student's Name  Davida Negron Birth Date  8/6/2000  Sex  Female School   Grade Level/ID#  12th Grade   HEALTH HISTORY          TO BE COMPLETED AND SIGNED BY PARENT/GUARDIAN AND VERIFIED BY HEALTH CARE PROVIDER    ALLERGIES  (Food, drug, insect, other) PHYSICAL EXAMINATION REQUIREMENTS    Entire section below to be completed by MD/DO/APN/PA       PHYSICAL EXAMINATION REQUIREMENTS (head circumference if <33 years old):   /82   Pulse 93   Ht 5' 0.63\" (1.54 m)   Wt 76.5 kg (168 lb 9.6 oz)   BMI 32. 2 Mouth/Dental Yes  Spinal examination Yes    Cardiovascular/HTN Yes  Nutritional status Yes    Respiratory Yes                   Diagnosis of Asthma: No Mental Health Yes        Currently Prescribed Asthma Medication:            Quick-relief  medication (e.

## (undated) NOTE — LETTER
[facility] [employer] Child Health Examination       Student's Name  [place] Birth D Signature                                                                                                                                   Title                           Date     Signature Female School   Grade Level/ID#  10th Grade   HEALTH HISTORY          TO BE COMPLETED AND SIGNED BY PARENT/GUARDIAN AND VERIFIED BY HEALTH CARE PROVIDER    ALLERGIES  (Food, drug, insect, other)  Amoxicillin MEDICATION  (List all prescribed or taken on a r /77   Pulse 89   Ht 5' (1.524 m)   Wt 71.5 kg (157 lb 9.6 oz)   BMI 30.78 kg/m²     DIABETES SCREENING  BMI>85% age/sex  No And any two of the following:  Family History Yes    Ethnic Minority  Yes          Signs of Insulin Resistance (hypertension, Currently Prescribed Asthma Medication:            Quick-relief  medication (e.g. Short Acting Beta Antagonist): No          Controller medication (e.g. inhaled corticosteroid):   No Other   NEEDS/MODIFICATIONS required in the school setting  None DIET

## (undated) NOTE — LETTER
VACCINE ADMINISTRATION RECORD  PARENT / GUARDIAN APPROVAL  Date: 2017  Vaccine administered to: Reva Banegas     : 2000    MRN: [id number]    A copy of the appropriate Centers for Disease Control and Prevention Vaccine Information statement

## (undated) NOTE — LETTER
Date & Time: 5/31/2025, 1:20 PM  Patient: Jeana oGnzalez  Encounter Provider(s):    Daisy Tapia APRN       To Whom It May Concern:    Jeana Gonzalez was seen and treated in our department on 5/31/2025. She can return to work per fever protocol.  Will be off today, and tomorrow.  If you need further work note, or if other documentation is needed please have her follow-up with primary care    JUAN C Courtney, 05/31/25, 1:21 PM